# Patient Record
Sex: FEMALE | Race: OTHER | HISPANIC OR LATINO
[De-identification: names, ages, dates, MRNs, and addresses within clinical notes are randomized per-mention and may not be internally consistent; named-entity substitution may affect disease eponyms.]

---

## 2020-02-04 ENCOUNTER — ASOB RESULT (OUTPATIENT)
Age: 40
End: 2020-02-04

## 2020-02-04 ENCOUNTER — APPOINTMENT (OUTPATIENT)
Dept: ANTEPARTUM | Facility: CLINIC | Age: 40
End: 2020-02-04
Payer: MEDICAID

## 2020-02-04 ENCOUNTER — TRANSCRIPTION ENCOUNTER (OUTPATIENT)
Age: 40
End: 2020-02-04

## 2020-02-04 ENCOUNTER — EMERGENCY (EMERGENCY)
Facility: HOSPITAL | Age: 40
LOS: 1 days | Discharge: HOME | End: 2020-02-04
Attending: EMERGENCY MEDICINE
Payer: MEDICAID

## 2020-02-04 VITALS
RESPIRATION RATE: 20 BRPM | TEMPERATURE: 98 F | HEIGHT: 66 IN | DIASTOLIC BLOOD PRESSURE: 100 MMHG | HEART RATE: 100 BPM | OXYGEN SATURATION: 99 % | WEIGHT: 177.03 LBS | SYSTOLIC BLOOD PRESSURE: 168 MMHG

## 2020-02-04 LAB
ALBUMIN SERPL ELPH-MCNC: 4.3 G/DL — SIGNIFICANT CHANGE UP (ref 3.3–5.2)
ALP SERPL-CCNC: 60 U/L — SIGNIFICANT CHANGE UP (ref 40–120)
ALT FLD-CCNC: 33 U/L — HIGH
ANION GAP SERPL CALC-SCNC: 11 MMOL/L — SIGNIFICANT CHANGE UP (ref 5–17)
APPEARANCE UR: CLEAR — SIGNIFICANT CHANGE UP
APTT BLD: 30.9 SEC — SIGNIFICANT CHANGE UP (ref 27.5–36.3)
AST SERPL-CCNC: 25 U/L — SIGNIFICANT CHANGE UP
BASOPHILS # BLD AUTO: 0.04 K/UL — SIGNIFICANT CHANGE UP (ref 0–0.2)
BASOPHILS NFR BLD AUTO: 0.5 % — SIGNIFICANT CHANGE UP (ref 0–2)
BILIRUB SERPL-MCNC: <0.2 MG/DL — LOW (ref 0.4–2)
BILIRUB UR-MCNC: NEGATIVE — SIGNIFICANT CHANGE UP
BLD GP AB SCN SERPL QL: SIGNIFICANT CHANGE UP
BUN SERPL-MCNC: 10 MG/DL — SIGNIFICANT CHANGE UP (ref 8–20)
CALCIUM SERPL-MCNC: 8.9 MG/DL — SIGNIFICANT CHANGE UP (ref 8.6–10.2)
CHLORIDE SERPL-SCNC: 104 MMOL/L — SIGNIFICANT CHANGE UP (ref 98–107)
CO2 SERPL-SCNC: 23 MMOL/L — SIGNIFICANT CHANGE UP (ref 22–29)
COLOR SPEC: YELLOW — SIGNIFICANT CHANGE UP
CREAT SERPL-MCNC: 0.34 MG/DL — LOW (ref 0.5–1.3)
DIFF PNL FLD: NEGATIVE — SIGNIFICANT CHANGE UP
EOSINOPHIL # BLD AUTO: 0.1 K/UL — SIGNIFICANT CHANGE UP (ref 0–0.5)
EOSINOPHIL NFR BLD AUTO: 1.3 % — SIGNIFICANT CHANGE UP (ref 0–6)
GLUCOSE SERPL-MCNC: 100 MG/DL — HIGH (ref 70–99)
GLUCOSE UR QL: NEGATIVE MG/DL — SIGNIFICANT CHANGE UP
HCG SERPL-ACNC: 8742 MIU/ML — HIGH
HCT VFR BLD CALC: 37.8 % — SIGNIFICANT CHANGE UP (ref 34.5–45)
HGB BLD-MCNC: 12.5 G/DL — SIGNIFICANT CHANGE UP (ref 11.5–15.5)
IMM GRANULOCYTES NFR BLD AUTO: 0.3 % — SIGNIFICANT CHANGE UP (ref 0–1.5)
INR BLD: 1.06 RATIO — SIGNIFICANT CHANGE UP (ref 0.88–1.16)
KETONES UR-MCNC: NEGATIVE — SIGNIFICANT CHANGE UP
LEUKOCYTE ESTERASE UR-ACNC: NEGATIVE — SIGNIFICANT CHANGE UP
LIDOCAIN IGE QN: 17 U/L — LOW (ref 22–51)
LYMPHOCYTES # BLD AUTO: 2.7 K/UL — SIGNIFICANT CHANGE UP (ref 1–3.3)
LYMPHOCYTES # BLD AUTO: 35.8 % — SIGNIFICANT CHANGE UP (ref 13–44)
MCHC RBC-ENTMCNC: 28.8 PG — SIGNIFICANT CHANGE UP (ref 27–34)
MCHC RBC-ENTMCNC: 33.1 GM/DL — SIGNIFICANT CHANGE UP (ref 32–36)
MCV RBC AUTO: 87.1 FL — SIGNIFICANT CHANGE UP (ref 80–100)
MONOCYTES # BLD AUTO: 0.56 K/UL — SIGNIFICANT CHANGE UP (ref 0–0.9)
MONOCYTES NFR BLD AUTO: 7.4 % — SIGNIFICANT CHANGE UP (ref 2–14)
NEUTROPHILS # BLD AUTO: 4.13 K/UL — SIGNIFICANT CHANGE UP (ref 1.8–7.4)
NEUTROPHILS NFR BLD AUTO: 54.7 % — SIGNIFICANT CHANGE UP (ref 43–77)
NITRITE UR-MCNC: NEGATIVE — SIGNIFICANT CHANGE UP
PH UR: 6 — SIGNIFICANT CHANGE UP (ref 5–8)
PLATELET # BLD AUTO: 388 K/UL — SIGNIFICANT CHANGE UP (ref 150–400)
POTASSIUM SERPL-MCNC: 3.8 MMOL/L — SIGNIFICANT CHANGE UP (ref 3.5–5.3)
POTASSIUM SERPL-SCNC: 3.8 MMOL/L — SIGNIFICANT CHANGE UP (ref 3.5–5.3)
PROT SERPL-MCNC: 7.3 G/DL — SIGNIFICANT CHANGE UP (ref 6.6–8.7)
PROT UR-MCNC: NEGATIVE MG/DL — SIGNIFICANT CHANGE UP
PROTHROM AB SERPL-ACNC: 12.2 SEC — SIGNIFICANT CHANGE UP (ref 10–12.9)
RBC # BLD: 4.34 M/UL — SIGNIFICANT CHANGE UP (ref 3.8–5.2)
RBC # FLD: 12.5 % — SIGNIFICANT CHANGE UP (ref 10.3–14.5)
SODIUM SERPL-SCNC: 138 MMOL/L — SIGNIFICANT CHANGE UP (ref 135–145)
SP GR SPEC: 1.02 — SIGNIFICANT CHANGE UP (ref 1.01–1.02)
UROBILINOGEN FLD QL: NEGATIVE MG/DL — SIGNIFICANT CHANGE UP
WBC # BLD: 7.55 K/UL — SIGNIFICANT CHANGE UP (ref 3.8–10.5)
WBC # FLD AUTO: 7.55 K/UL — SIGNIFICANT CHANGE UP (ref 3.8–10.5)

## 2020-02-04 PROCEDURE — 80053 COMPREHEN METABOLIC PANEL: CPT

## 2020-02-04 PROCEDURE — 85027 COMPLETE CBC AUTOMATED: CPT

## 2020-02-04 PROCEDURE — 86900 BLOOD TYPING SEROLOGIC ABO: CPT

## 2020-02-04 PROCEDURE — 85610 PROTHROMBIN TIME: CPT

## 2020-02-04 PROCEDURE — 81003 URINALYSIS AUTO W/O SCOPE: CPT

## 2020-02-04 PROCEDURE — 86901 BLOOD TYPING SEROLOGIC RH(D): CPT

## 2020-02-04 PROCEDURE — 83690 ASSAY OF LIPASE: CPT

## 2020-02-04 PROCEDURE — 99283 EMERGENCY DEPT VISIT LOW MDM: CPT

## 2020-02-04 PROCEDURE — 99284 EMERGENCY DEPT VISIT MOD MDM: CPT

## 2020-02-04 PROCEDURE — 86850 RBC ANTIBODY SCREEN: CPT

## 2020-02-04 PROCEDURE — 76817 TRANSVAGINAL US OBSTETRIC: CPT

## 2020-02-04 PROCEDURE — 85730 THROMBOPLASTIN TIME PARTIAL: CPT

## 2020-02-04 PROCEDURE — 36415 COLL VENOUS BLD VENIPUNCTURE: CPT

## 2020-02-04 PROCEDURE — 84702 CHORIONIC GONADOTROPIN TEST: CPT

## 2020-02-04 RX ORDER — MISOPROSTOL 200 UG/1
8 TABLET ORAL
Qty: 32 | Refills: 0
Start: 2020-02-04 | End: 2020-02-04

## 2020-02-04 RX ORDER — IBUPROFEN 200 MG
1 TABLET ORAL
Qty: 30 | Refills: 0
Start: 2020-02-04

## 2020-02-04 NOTE — ED STATDOCS - PROGRESS NOTE DETAILS
SHEMAR Remy: Patient evaluated by intake physician. HPI/ROS/PE as noted above. Will follow up plan per intake physician and continue to assess patient. SHEMAR Remy: Pt evaluated by GYN. Cytotec to be sent to pharmacy. Follow up within the week.

## 2020-02-04 NOTE — ED STATDOCS - NSFOLLOWUPINSTRUCTIONS_ED_ALL_ED_FT
- Prescription sent to pharmacy.  - Please call tomorrow to schedule follow up appointment with OB within 1 week.  - Please seek immediate medical attention for any new/worsening, signs/symptoms, or concerns.

## 2020-02-04 NOTE — ED STATDOCS - PATIENT PORTAL LINK FT
You can access the FollowMyHealth Patient Portal offered by [facility] by registering at the following website: [email or web address]. By joining [employer]’s FollowMyHealth portal, you will also be able to view your health information using other applications (apps) compatible with our system. You can access the FollowMyHealth Patient Portal offered by [facility] by registering at the following website: [email or web address]. By joining [employer]’s FollowMyHealth portal, you will also be able to view your health information using other applications (apps) compatible with our system.

## 2020-02-04 NOTE — CONSULT NOTE ADULT - ASSESSMENT
YOLY DRAPER is a 40yo  @ 9wks4d by LMP (19) who presents to the ED from [facility] office for management of missed . On presentation to the ED, patient was hemodynamically stable, VSS. Sonogram from [facility] office revealed, CRL 15.3cm correlating to 8wks GA consistent with single IUP with no fetal cardiac activity.     Patient seen and examined at bedside with Dr. Ku. Given b-hCG and ultrasound findings, likely missed . Patient counseled in regards to medical vs. surgical vs. expectant management. Patient elects for medical management at this time. Risks/benefits of medical management with cytotec (including, but not limited to cramping, vaginal bleeding, need for repeat therapy), discussed with patient. Patient to be discharged home with cytotec 800mcg (with repeat 800mcg after 4 hours if no vaginal bleeding). Patient advised to follow up with [facility] clinic for repeat b-hCG and TVUS.    Plan d/w Dr. Ku

## 2020-02-04 NOTE — ED STATDOCS - ATTENDING CONTRIBUTION TO CARE
I, Rachel Garcia, performed the initial face to face bedside interview with this patient regarding history of present illness, review of symptoms and relevant past medical, social and family history.  I completed an independent physical examination.  I was the initial provider who evaluated this patient. I have signed out the follow up of any pending tests (i.e. labs, radiological studies) to the ACP.  I have communicated the patient’s plan of care and disposition with the ACP.  The history, relevant review of systems, past medical and surgical history, medical decision making, and physical examination was documented by the scribe in my presence and I attest to the accuracy of the documentation.

## 2020-02-04 NOTE — CONSULT NOTE ADULT - SUBJECTIVE AND OBJECTIVE BOX
YOLY DRAPER is a 40yo  @ 9wks4d by LMP (19) who presents to the ED from [facility] office for management of missed .    Patient states that she was seen today at the [facility] office for routine OB sonogram, at which time she was told that there was no fetal heart rate appreciated on ultrasound. She was told that she likely had a miscarriage and was told to come to the ED for further evaluation. Currently, she denies any vaginal bleeding, or discharge. Denies any abdominal trauma to the abdomen as well. Otherwise, uncomplicated prenatal course thus far.    OB HISTORY:    2003  @ 40wks - Male    2011  @ 40wks - Male       PAST GYN HISTORY:   - HX of abnormal pap smear  - Hx of ovarian cysts    PAST MEDICAL & SURGICAL HISTORY:  Denies      Allergies    No Known Allergies    Intolerances        MEDICATIONS  (STANDING):    MEDICATIONS  (PRN):      FAMILY HISTORY:      Social Hx: denies tobacco use, drug use. Social drinker.     ROS:  CONSTITUTIONAL: No fever, weight loss, or fatigue  RESPIRATORY: No shortness of breath  CARDIOVASCULAR: No chest pain, palpitations, dizziness, or leg swelling  GASTROINTESTINAL: No abdominal pain, nausea, vomiting, diarrhea or constipation.   GENITOURINARY: No dysuria or incontinence   ENDOCRINE: No heat or cold intolerance; No hair loss  PSYCHIATRIC: No depression, anxiety  HEME/LYMPH: No easy bruising, or bleeding gums    PHYSICAL EXAM-  T(C): 36.7 (20 @ 17:17), Max: 36.7 (20 @ 17:17)  HR: 100 (20 @ 17:17) (100 - 100)  BP: 168/100 (20 @ 17:17) (168/100 - 168/100)  RR: 20 (20 @ 17:17) (20 - 20)  SpO2: 99% (20 @ 17:17) (99% - 99%)    CONSTITUTIONAL: well developed no apparent distress, alert, oriented x 3.  ABDOMEN: soft, non-tender, +BS, no guarding/rebound/rigidity  PELVIC: Denies                                                       12.5   7.55  )-----------( 388      ( 2020 20:13 )             37.8         138  |  104  |  10.0  ----------------------------<  100<H>  3.8   |  23.0  |  0.34<L>    Ca    8.9      2020 20:13    TPro  7.3  /  Alb  4.3  /  TBili  <0.2<L>  /  DBili  x   /  AST  25  /  ALT  33<H>  /  AlkPhos  60  -    SERUM [facility]    8742.0   @ 20:13

## 2020-02-04 NOTE — CONSULT NOTE ADULT - ATTENDING COMMENTS
with missed ab at 8 weeks, no bleeding, options discussed with patient, she opted for medical mgmt  -cytotec 800mcg sent to pharmacy along with ibuprofen  -[facility]  -strong precautions provided

## 2020-02-04 NOTE — ED STATDOCS - OBJECTIVE STATEMENT
40 y/o F pt currently 9 weeks pregnant presents c/o ABD pain and chills. Had o/p US at [facility] today, which showed no fetal heart beat. Was sent to ED for further eval. Denies fever, CP, SOB, vaginal bleeding or dysuria. Tolerating PO, but has had decreased PO intake over the past few days. No further complaints at this time.

## 2020-02-04 NOTE — ED STATDOCS - CLINICAL SUMMARY MEDICAL DECISION MAKING FREE TEXT BOX
Patient presents s/p o/p US at [facility] showed fetal demise. Will consult OB, check labs and reeval.

## 2020-02-19 ENCOUNTER — ASOB RESULT (OUTPATIENT)
Age: 40
End: 2020-02-19

## 2020-02-19 ENCOUNTER — APPOINTMENT (OUTPATIENT)
Dept: ANTEPARTUM | Facility: CLINIC | Age: 40
End: 2020-02-19
Payer: MEDICAID

## 2020-02-19 PROCEDURE — 76817 TRANSVAGINAL US OBSTETRIC: CPT

## 2020-02-24 ENCOUNTER — RESULT REVIEW (OUTPATIENT)
Age: 40
End: 2020-02-24

## 2020-02-24 ENCOUNTER — INPATIENT (INPATIENT)
Facility: HOSPITAL | Age: 40
LOS: 0 days | Discharge: HOME | DRG: 770 | End: 2020-02-25
Attending: OBSTETRICS & GYNECOLOGY | Admitting: OBSTETRICS & GYNECOLOGY
Payer: MEDICAID

## 2020-02-24 ENCOUNTER — TRANSCRIPTION ENCOUNTER (OUTPATIENT)
Age: 40
End: 2020-02-24

## 2020-02-24 VITALS
TEMPERATURE: 98 F | WEIGHT: 179.9 LBS | DIASTOLIC BLOOD PRESSURE: 88 MMHG | HEART RATE: 76 BPM | SYSTOLIC BLOOD PRESSURE: 146 MMHG | HEIGHT: 64 IN | RESPIRATION RATE: 20 BRPM | OXYGEN SATURATION: 99 %

## 2020-02-24 DIAGNOSIS — O02.1 MISSED ABORTION: ICD-10-CM

## 2020-02-24 LAB
ANION GAP SERPL CALC-SCNC: 12 MMOL/L — SIGNIFICANT CHANGE UP (ref 5–17)
APPEARANCE UR: CLEAR — SIGNIFICANT CHANGE UP
APTT BLD: 31.1 SEC — SIGNIFICANT CHANGE UP (ref 27.5–36.3)
BASOPHILS # BLD AUTO: 0.04 K/UL — SIGNIFICANT CHANGE UP (ref 0–0.2)
BASOPHILS NFR BLD AUTO: 0.6 % — SIGNIFICANT CHANGE UP (ref 0–2)
BILIRUB UR-MCNC: NEGATIVE — SIGNIFICANT CHANGE UP
BLD GP AB SCN SERPL QL: SIGNIFICANT CHANGE UP
BUN SERPL-MCNC: 9 MG/DL — SIGNIFICANT CHANGE UP (ref 8–20)
CALCIUM SERPL-MCNC: 9.2 MG/DL — SIGNIFICANT CHANGE UP (ref 8.6–10.2)
CHLORIDE SERPL-SCNC: 103 MMOL/L — SIGNIFICANT CHANGE UP (ref 98–107)
CO2 SERPL-SCNC: 26 MMOL/L — SIGNIFICANT CHANGE UP (ref 22–29)
COLOR SPEC: YELLOW — SIGNIFICANT CHANGE UP
CREAT SERPL-MCNC: 0.5 MG/DL — SIGNIFICANT CHANGE UP (ref 0.5–1.3)
DIFF PNL FLD: NEGATIVE — SIGNIFICANT CHANGE UP
EOSINOPHIL # BLD AUTO: 0.12 K/UL — SIGNIFICANT CHANGE UP (ref 0–0.5)
EOSINOPHIL NFR BLD AUTO: 1.8 % — SIGNIFICANT CHANGE UP (ref 0–6)
GLUCOSE SERPL-MCNC: 124 MG/DL — HIGH (ref 70–99)
GLUCOSE UR QL: NEGATIVE MG/DL — SIGNIFICANT CHANGE UP
HCT VFR BLD CALC: 40.3 % — SIGNIFICANT CHANGE UP (ref 34.5–45)
HGB BLD-MCNC: 13 G/DL — SIGNIFICANT CHANGE UP (ref 11.5–15.5)
IMM GRANULOCYTES NFR BLD AUTO: 0.1 % — SIGNIFICANT CHANGE UP (ref 0–1.5)
INR BLD: 1.09 RATIO — SIGNIFICANT CHANGE UP (ref 0.88–1.16)
KETONES UR-MCNC: NEGATIVE — SIGNIFICANT CHANGE UP
LEUKOCYTE ESTERASE UR-ACNC: NEGATIVE — SIGNIFICANT CHANGE UP
LYMPHOCYTES # BLD AUTO: 3.24 K/UL — SIGNIFICANT CHANGE UP (ref 1–3.3)
LYMPHOCYTES # BLD AUTO: 48.3 % — HIGH (ref 13–44)
MCHC RBC-ENTMCNC: 28.3 PG — SIGNIFICANT CHANGE UP (ref 27–34)
MCHC RBC-ENTMCNC: 32.3 GM/DL — SIGNIFICANT CHANGE UP (ref 32–36)
MCV RBC AUTO: 87.6 FL — SIGNIFICANT CHANGE UP (ref 80–100)
MONOCYTES # BLD AUTO: 0.54 K/UL — SIGNIFICANT CHANGE UP (ref 0–0.9)
MONOCYTES NFR BLD AUTO: 8 % — SIGNIFICANT CHANGE UP (ref 2–14)
NEUTROPHILS # BLD AUTO: 2.76 K/UL — SIGNIFICANT CHANGE UP (ref 1.8–7.4)
NEUTROPHILS NFR BLD AUTO: 41.2 % — LOW (ref 43–77)
NITRITE UR-MCNC: NEGATIVE — SIGNIFICANT CHANGE UP
PH UR: 6.5 — SIGNIFICANT CHANGE UP (ref 5–8)
PLATELET # BLD AUTO: 412 K/UL — HIGH (ref 150–400)
POTASSIUM SERPL-MCNC: 4.2 MMOL/L — SIGNIFICANT CHANGE UP (ref 3.5–5.3)
POTASSIUM SERPL-SCNC: 4.2 MMOL/L — SIGNIFICANT CHANGE UP (ref 3.5–5.3)
PROT UR-MCNC: NEGATIVE MG/DL — SIGNIFICANT CHANGE UP
PROTHROM AB SERPL-ACNC: 12.3 SEC — SIGNIFICANT CHANGE UP (ref 10–12.9)
RBC # BLD: 4.6 M/UL — SIGNIFICANT CHANGE UP (ref 3.8–5.2)
RBC # FLD: 12.3 % — SIGNIFICANT CHANGE UP (ref 10.3–14.5)
SODIUM SERPL-SCNC: 141 MMOL/L — SIGNIFICANT CHANGE UP (ref 135–145)
SP GR SPEC: 1.01 — SIGNIFICANT CHANGE UP (ref 1.01–1.02)
UROBILINOGEN FLD QL: NEGATIVE MG/DL — SIGNIFICANT CHANGE UP
WBC # BLD: 6.71 K/UL — SIGNIFICANT CHANGE UP (ref 3.8–10.5)
WBC # FLD AUTO: 6.71 K/UL — SIGNIFICANT CHANGE UP (ref 3.8–10.5)

## 2020-02-24 PROCEDURE — 76817 TRANSVAGINAL US OBSTETRIC: CPT | Mod: 26

## 2020-02-24 PROCEDURE — 99283 EMERGENCY DEPT VISIT LOW MDM: CPT

## 2020-02-24 PROCEDURE — 88305 TISSUE EXAM BY PATHOLOGIST: CPT | Mod: 26

## 2020-02-24 PROCEDURE — 76815 OB US LIMITED FETUS(S): CPT | Mod: 26

## 2020-02-24 PROCEDURE — 76801 OB US < 14 WKS SINGLE FETUS: CPT | Mod: 26

## 2020-02-24 RX ORDER — SODIUM CHLORIDE 9 MG/ML
1000 INJECTION INTRAMUSCULAR; INTRAVENOUS; SUBCUTANEOUS
Refills: 0 | Status: DISCONTINUED | OUTPATIENT
Start: 2020-02-24 | End: 2020-02-25

## 2020-02-24 RX ADMIN — SODIUM CHLORIDE 100 MILLILITER(S): 9 INJECTION INTRAMUSCULAR; INTRAVENOUS; SUBCUTANEOUS at 20:54

## 2020-02-24 NOTE — ED ADULT TRIAGE NOTE - CHIEF COMPLAINT QUOTE
here 2 weeks ago, had OB Mis. Taking medications. Had sonogram 2/19. was told to come in for retaining products. no bleeding. Some abd pain

## 2020-02-24 NOTE — CONSULT NOTE ADULT - ATTENDING COMMENTS
desires surgical management for failed medical management of embryonic demise  understood risks, benefits, alternatives

## 2020-02-24 NOTE — ED STATDOCS - OBJECTIVE STATEMENT
had missed AB at 8 weeks, opted for medical management; had repeat US on , was told had retained product of conception presents to ED. Patient has very minimal episodic lower ABD pain. Denies fever, chiils, N/V/D. Patient ate rice, yucca and water 1 hour PTA. No further complaints at this time.  had missed AB at 8 weeks, opted for medical management; had repeat US on , was told had retained product of conception presents to ED D&C. Patient has very minimal episodic lower ABD pain. Denies fever, chills, vaginalbleeding N/V/D. Patient ate rice, yucca and water 1 hour PTA. No further complaints at this time.

## 2020-02-24 NOTE — ED ADULT NURSE NOTE - NSIMPLEMENTINTERV_GEN_ALL_ED
Implemented All Universal Safety Interventions:  [place] to call system. Call bell, personal items and telephone within reach. Instruct patient to call for assistance. Room bathroom lighting operational. Non-slip footwear when patient is off stretcher. Physically safe environment: no spills, clutter or unnecessary equipment. Stretcher in lowest position, wheels locked, appropriate side rails in place.

## 2020-02-24 NOTE — H&P ADULT - ASSESSMENT
YOLY DRAPER is a 38yo  @12w3d  by LMP (19) who presents to the ED from [facility] clinic for continued management following failed medical management of missed .    Patient hemodynamically stable at this time. Sono report confirmed retained products of conception. Will proceed with dilation and curettage. Patient counseled in regards to risks/benefits of surgical management.     Plan d/w Dr. Walls

## 2020-02-24 NOTE — ED STATDOCS - CLINICAL SUMMARY MEDICAL DECISION MAKING FREE TEXT BOX
Patient presents for D&C, will check preop labs and OB will f/u. Patient w hx missed ab and failed medical therapy presents for D&C, will check preop labs and OB will f/u.

## 2020-02-24 NOTE — ED ADULT TRIAGE NOTE - ARRIVAL FROM
Home I personally performed the service described in the documentation recorded by the scribe in my presence, and it accurately and completely records my words and actions.

## 2020-02-24 NOTE — H&P ADULT - HISTORY OF PRESENT ILLNESS
YOLY DRAPER is a 38yo  @12w3d  by LMP (19) who presents to the ED from [facility] clinic for continued management following failed medical management of missed     Patient was seen in the ED on 2020 after formal sonographic diagnosis of missed . At the time, patient was counseled in regards to expectant vs. medical vs. surgical management of missed ab. At the time, patient elected to undergo medical management with cytotec and was sent home with cytotec 800 mcg and NSAIDs and told to follow up with [facility] for repeat sonogram. Patient states that she took the cytotec on 2020 at which time she subsequently experienced intense abdominal cramping and vaginal bleeding. She states for the next 8 days afterwards, she continued to experience intermittent abdominal cramping pain and spotting vaginal bleeding.    Patient states that she had a repeat sonograph performed on 2020 at which time she was told that she still had retained products of conception and would possible require surgical management.     She denies any fevers, chills, CP, SOB, purulent vaginal discharge, diarrhea, or GI pain. Otherwise, no additional complaints.       OB HISTORY:    2003  @ 40wks - Male    2011  @ 40wks - Male       PAST GYN HISTORY:   - HX of abnormal pap smear  - Hx of ovarian cysts    PAST MEDICAL & SURGICAL HISTORY:  Denies      Allergies    No Known Allergies    Intolerances        MEDICATIONS  (STANDING):  sodium chloride 0.9%. 1000 milliLiter(s) (100 mL/Hr) IV Continuous <Continuous>    MEDICATIONS  (PRN):      FAMILY HISTORY:      Social Hx: denies tobacco use, drug use. Social drinker.     ROS:  As per HPI    PHYSICAL EXAM-  T(C): 36.6 (20 @ 17:12), Max: 36.6 (20 @ 17:12)  HR: 76 (20 @ 17:12) (76 - 76)  BP: 146/88 (20 @ 17:12) (146/88 - 146/88)  RR: 20 (20 @ 17:12) (20 - 20)  SpO2: 99% (20 @ 17:12) (99% - 99%)    CONSTITUTIONAL: well developed no apparent distress, alert, oriented x 3.  PULMONARY: Lungs clear to auscultation   CARDIOVASCULAR: RRR   ABDOMEN: soft, Minimal TTP along RLQ/LLQ, +BS, no guarding/rebound/rigidity    PELVIC: Deferred                                                      13.0   6.71  )-----------( 412      ( 2020 18:52 )             40.3     02-    141  |  103  |  9.0  ----------------------------<  124<H>  4.2   |  26.0  |  0.50    Ca    9.2      2020 18:52

## 2020-02-24 NOTE — ED STATDOCS - PROGRESS NOTE DETAILS
39 YEAR old female  presents to the ED for RPOC on US. Pt had a fetal demise at 8 weeks and was given cytotec. US on  showed retained products. Denies abd pain, vaginal bleeding. HPI/ ROS/ PEx as stated above. Pre-OP labs ordered.  GYN: [facility] consulted. US shows retained products, GYN will take pt to OR for D&C.

## 2020-02-24 NOTE — CONSULT NOTE ADULT - SUBJECTIVE AND OBJECTIVE BOX
YOLY DRAPER is a 40yo  @12w3d  by LMP (19) who presents to the ED from [facility] clinic for continued management following failed medical management of missed     Patient was seen in the ED on 2020 after formal sonographic diagnosis of missed . At the time, patient was counseled in regards to expectant vs. medical vs. surgical management of missed ab. At the time, patient elected to undergo medical management with cytotec and was sent home with cytotec 800 mcg and NSAIDs and told to follow up with [facility] for repeat sonogram. Patient states that she took the cytotec on 2020 at which time she subsequently experienced intense abdominal cramping and vaginal bleeding. She states for the next 8 days afterwards, she continued to experience intermittent abdominal cramping pain and spotting vaginal bleeding.    Patient states that she had a repeat sonograph performed on 2020 at which time she was told that she still had retained products of conception and would possible require surgical management.     She denies any fevers, chills, CP, SOB, purulent vaginal discharge, diarrhea, or GI pain. Otherwise, no additional complaints.       OB HISTORY:    2003  @ 40wks - Male    2011  @ 40wks - Male       PAST GYN HISTORY:   - HX of abnormal pap smear  - Hx of ovarian cysts    PAST MEDICAL & SURGICAL HISTORY:  Denies      Allergies    No Known Allergies    Intolerances        MEDICATIONS  (STANDING):  sodium chloride 0.9%. 1000 milliLiter(s) (100 mL/Hr) IV Continuous <Continuous>    MEDICATIONS  (PRN):      FAMILY HISTORY:      Social Hx: denies tobacco use, drug use. Social drinker.     ROS:  As per HPI    PHYSICAL EXAM-  T(C): 36.6 (20 @ 17:12), Max: 36.6 (20 @ 17:12)  HR: 76 (20 @ 17:12) (76 - 76)  BP: 146/88 (20 @ 17:12) (146/88 - 146/88)  RR: 20 (20 @ 17:12) (20 - 20)  SpO2: 99% (20 @ 17:12) (99% - 99%)    CONSTITUTIONAL: well developed no apparent distress, alert, oriented x 3.  PULMONARY: Lungs clear to auscultation   CARDIOVASCULAR: RRR   ABDOMEN: soft, Minimal TTP along RLQ/LLQ, +BS, no guarding/rebound/rigidity    PELVIC: Deferred                                                      13.0   6.71  )-----------( 412      ( 2020 18:52 )             40.3     02-    141  |  103  |  9.0  ----------------------------<  124<H>  4.2   |  26.0  |  0.50    Ca    9.2      2020 18:52          Radiology:

## 2020-02-24 NOTE — H&P ADULT - NSHPLABSRESULTS_GEN_ALL_CORE
< from: US Transvaginal, OB (20 @ 22:22) >       EXAM:  US OB LES THAN 14 WKS 1ST GEST                         EXAM:  US OB TRANSVAGINAL                          PROCEDURE DATE:  2020          INTERPRETATION:  CLINICAL INFORMATION: Spontaneous  15 days ago. Evaluate for retained products of conception.    COMPARISON: None none during this gestation.  Beta-HC    Endovaginal and transabdominal pelvic sonogram. Color and Spectral Doppler was performed.    FINDINGS:    Uterus: Retroverted uterus measures 8.7 cm. A 0.7 cm cyst is noted in the myometrium.     Endometrium: Heterogeneous endometrium measures 9 mm. Tiny endometrial cyst noted at the fundal aspect. High velocity, low-resistance trophoblastic type flow with velocities up to 152 cm/s, noted at the fundal aspect of the endometrium.     Right ovary: 3.1 x 2.2 x 1.7 cm. A 1.6 cm corpus luteum.. Normal arterial and venous waveforms.    Left ovary: 3.9 x 0.9 x 1.6 cm. Within normal limits. Normal arterial and venous waveforms.    Fluid: None.    IMPRESSION:    Trophoblastic type flow in the endometrium compatible with retained products of conception.                PETER LU M.D., ATTENDING RADIOLOGIST  This document has been electronically signed. 2020 10:30PM                  < end of copied text >

## 2020-02-24 NOTE — CONSULT NOTE ADULT - ASSESSMENT
YOLY DRAPER is a 38yo  @12w3d  by [facility] (19) who presents to the ED from [facility] clinic for continued management following failed medical management of missed

## 2020-02-25 VITALS
TEMPERATURE: 98 F | DIASTOLIC BLOOD PRESSURE: 88 MMHG | OXYGEN SATURATION: 99 % | RESPIRATION RATE: 18 BRPM | HEART RATE: 70 BPM | SYSTOLIC BLOOD PRESSURE: 132 MMHG

## 2020-02-25 PROCEDURE — 86850 RBC ANTIBODY SCREEN: CPT

## 2020-02-25 PROCEDURE — 80048 BASIC METABOLIC PNL TOTAL CA: CPT

## 2020-02-25 PROCEDURE — 76817 TRANSVAGINAL US OBSTETRIC: CPT

## 2020-02-25 PROCEDURE — 85027 COMPLETE CBC AUTOMATED: CPT

## 2020-02-25 PROCEDURE — 36415 COLL VENOUS BLD VENIPUNCTURE: CPT

## 2020-02-25 PROCEDURE — 85730 THROMBOPLASTIN TIME PARTIAL: CPT

## 2020-02-25 PROCEDURE — 86901 BLOOD TYPING SEROLOGIC RH(D): CPT

## 2020-02-25 PROCEDURE — 81003 URINALYSIS AUTO W/O SCOPE: CPT

## 2020-02-25 PROCEDURE — 99285 EMERGENCY DEPT VISIT HI MDM: CPT

## 2020-02-25 PROCEDURE — 76801 OB US < 14 WKS SINGLE FETUS: CPT

## 2020-02-25 PROCEDURE — 88305 TISSUE EXAM BY PATHOLOGIST: CPT

## 2020-02-25 PROCEDURE — 86900 BLOOD TYPING SEROLOGIC ABO: CPT

## 2020-02-25 PROCEDURE — 85610 PROTHROMBIN TIME: CPT

## 2020-02-25 RX ORDER — SODIUM CHLORIDE 9 MG/ML
1000 INJECTION, SOLUTION INTRAVENOUS
Refills: 0 | Status: DISCONTINUED | OUTPATIENT
Start: 2020-02-25 | End: 2020-02-25

## 2020-02-25 RX ORDER — HYDROMORPHONE HYDROCHLORIDE 2 MG/ML
0.5 INJECTION INTRAMUSCULAR; INTRAVENOUS; SUBCUTANEOUS
Refills: 0 | Status: DISCONTINUED | OUTPATIENT
Start: 2020-02-25 | End: 2020-02-25

## 2020-02-25 RX ORDER — IBUPROFEN 200 MG
1 TABLET ORAL
Qty: 30 | Refills: 0
Start: 2020-02-25

## 2020-02-25 RX ORDER — ONDANSETRON 8 MG/1
4 TABLET, FILM COATED ORAL ONCE
Refills: 0 | Status: DISCONTINUED | OUTPATIENT
Start: 2020-02-25 | End: 2020-02-25

## 2020-02-25 RX ADMIN — Medication 110 MILLIGRAM(S): at 00:25

## 2020-02-25 NOTE — BRIEF OPERATIVE NOTE - OPERATION/FINDINGS
8 WEEK SIZE RETROVERTED UTERUS. SMALL AMOUNT OF PRODUCTS CONCEPTION ASPIRATED USING SIZE 8 SUCTION CURETTE.

## 2020-02-25 NOTE — DISCHARGE NOTE PROVIDER - NSDCCPTREATMENT_GEN_ALL_CORE_FT
PRINCIPAL PROCEDURE  Procedure: Dilation and curettage, uterus, using suction, for incomplete   Findings and Treatment:

## 2020-02-25 NOTE — DISCHARGE NOTE PROVIDER - HOSPITAL COURSE
YOLY DRAPER is a 40yo  @12w3d  by LMP (19) who presents to the ED from [facility] clinic for continued management following failed medical management of missed . Patient post-operatively had an uncomplicated hospital course. Her pain was well controlled. She is tolerating a regular diet. She is ambulating independently. Labs and Vitals WNL upon discharge.

## 2020-02-25 NOTE — ASU DISCHARGE PLAN (ADULT/PEDIATRIC) - CARE PROVIDER_API CALL
Peyton Walls)  Obstetrics and Gynecology  [address] [place] [address]  Phone: [phone]  Fax: [phone]  Follow Up Time:

## 2020-02-25 NOTE — ASU DISCHARGE PLAN (ADULT/PEDIATRIC) - ACTIVITY LEVEL
Nothing per vagina/No douching/No tub baths/Weight bearing as tolerated/No tampons/No intercourse/Nothing per rectum/No heavy lifting

## 2020-02-25 NOTE — BRIEF OPERATIVE NOTE - NSICDXBRIEFPROCEDURE_GEN_ALL_CORE_FT
PROCEDURES:  Dilation and curettage, uterus, using suction, for incomplete  2020 00:59:05  Rimpel, Katherinne

## 2020-02-25 NOTE — DISCHARGE NOTE PROVIDER - NSDCFUADDINST_GEN_ALL_CORE_FT
May walk and climb stairs. No vigorous activity. Do not lift anything greater than 10lbs. Nothing per vagina x 6 weeks. Do not drive while on pain medication.

## 2020-02-25 NOTE — DISCHARGE NOTE PROVIDER - CARE PROVIDER_API CALL
Peyton Walls)  Obstetrics and Gynecology  [address] [place] [address]  Phone: [phone]  Fax: [phone]  Follow Up Time: 1 week

## 2020-02-25 NOTE — ASU DISCHARGE PLAN (ADULT/PEDIATRIC) - CALL YOUR DOCTOR IF YOU HAVE ANY OF THE FOLLOWING:
Fever greater than (need to indicate Fahrenheit or Celsius)/Numbness, tingling, color or temperature change to extremity/Unable to urinate/Pain not relieved by Medications

## 2021-12-05 ENCOUNTER — EMERGENCY (EMERGENCY)
Facility: HOSPITAL | Age: 41
LOS: 1 days | End: 2021-12-05
Admitting: EMERGENCY MEDICINE
Payer: MEDICAID

## 2021-12-05 PROCEDURE — 99285 EMERGENCY DEPT VISIT HI MDM: CPT

## 2021-12-05 PROCEDURE — 76801 OB US < 14 WKS SINGLE FETUS: CPT | Mod: 26

## 2021-12-05 PROCEDURE — 76817 TRANSVAGINAL US OBSTETRIC: CPT | Mod: 26

## 2022-07-07 NOTE — ED STATDOCS - ATTESTATION, MLM
I have reviewed and confirmed nurses' notes for patient's medications, allergies, medical history, and surgical history. No indicators present

## 2023-10-17 ENCOUNTER — ASOB RESULT (OUTPATIENT)
Age: 43
End: 2023-10-17

## 2023-10-17 ENCOUNTER — APPOINTMENT (OUTPATIENT)
Dept: ANTEPARTUM | Facility: CLINIC | Age: 43
End: 2023-10-17
Payer: MEDICAID

## 2023-10-17 PROCEDURE — 36416 COLLJ CAPILLARY BLOOD SPEC: CPT

## 2023-10-17 PROCEDURE — 76813 OB US NUCHAL MEAS 1 GEST: CPT

## 2023-10-20 LAB
ADDITIONAL US: NORMAL
COMMENTS: AFP: NORMAL
CRL SCAN TWIN B: NORMAL
CRL SCAN: NORMAL
CROWN RUMP LENGTH TWIN B: NORMAL
CROWN RUMP LENGTH: 67.8 MM
DOWN SYNDROME AGE RISK: NORMAL
DOWN SYNDROME INTERPRETATION: NORMAL
DOWN SYNDROME SCREENING RISK: NORMAL
GEST. AGE ON COLLECTION DATE: 12.9 WEEKS
HCG MOM: 1.25
HCG VALUE: 98.6 IU/ML
MATERNAL AGE AT EDD: 41.4 YR
NOTE: AFP: NORMAL
NT MOM TWIN B: NORMAL
NT TWIN B: NORMAL
NUCHAL TRANSLUCENCY (NT): 2.3 MM
NUCHAL TRANSLUCENCY MOM: 1.31
NUMBER OF FETUSES: 1
PAPP-A MOM: 0.75
PAPP-A VALUE: 654.5 NG/ML
RACE: NORMAL
RESULTS AFP: NORMAL
SONOGRAPHER ID#: NORMAL
SUBMIT PART 2 SAMPLE USING: NORMAL
TEST RESULTS: AFP: NORMAL
TRISOMY 18 AGE RISK: NORMAL
TRISOMY 18 INTERPRETATION: NORMAL
TRISOMY 18 SCREENING RISK: NORMAL
WEIGHT AFP: 192 LBS

## 2023-11-29 ENCOUNTER — APPOINTMENT (OUTPATIENT)
Dept: ANTEPARTUM | Facility: CLINIC | Age: 43
End: 2023-11-29
Payer: MEDICAID

## 2023-11-29 ENCOUNTER — ASOB RESULT (OUTPATIENT)
Age: 43
End: 2023-11-29

## 2023-11-29 PROCEDURE — 76817 TRANSVAGINAL US OBSTETRIC: CPT

## 2023-11-29 PROCEDURE — 76811 OB US DETAILED SNGL FETUS: CPT

## 2024-01-24 ENCOUNTER — APPOINTMENT (OUTPATIENT)
Dept: ANTEPARTUM | Facility: CLINIC | Age: 44
End: 2024-01-24
Payer: MEDICAID

## 2024-01-24 ENCOUNTER — ASOB RESULT (OUTPATIENT)
Age: 44
End: 2024-01-24

## 2024-01-24 PROCEDURE — 76816 OB US FOLLOW-UP PER FETUS: CPT

## 2024-02-19 ENCOUNTER — INPATIENT (INPATIENT)
Facility: HOSPITAL | Age: 44
LOS: 18 days | Discharge: HOME | End: 2024-03-09
Attending: OBSTETRICS & GYNECOLOGY | Admitting: OBSTETRICS & GYNECOLOGY
Payer: COMMERCIAL

## 2024-02-19 VITALS
SYSTOLIC BLOOD PRESSURE: 167 MMHG | DIASTOLIC BLOOD PRESSURE: 84 MMHG | RESPIRATION RATE: 18 BRPM | TEMPERATURE: 97 F | HEART RATE: 86 BPM

## 2024-02-19 DIAGNOSIS — Z00.00 ENCOUNTER FOR GENERAL ADULT MEDICAL EXAMINATION WITHOUT ABNORMAL FINDINGS: ICD-10-CM

## 2024-02-19 DIAGNOSIS — Z3A.31 31 WEEKS GESTATION OF PREGNANCY: ICD-10-CM

## 2024-02-19 DIAGNOSIS — O26.899 OTHER SPECIFIED PREGNANCY RELATED CONDITIONS, UNSPECIFIED TRIMESTER: ICD-10-CM

## 2024-02-19 DIAGNOSIS — O14.13 SEVERE PRE-ECLAMPSIA, THIRD TRIMESTER: ICD-10-CM

## 2024-02-19 DIAGNOSIS — O26.893 OTHER SPECIFIED PREGNANCY RELATED CONDITIONS, THIRD TRIMESTER: ICD-10-CM

## 2024-02-19 DIAGNOSIS — O09.529 SUPERVISION OF ELDERLY MULTIGRAVIDA, UNSPECIFIED TRIMESTER: ICD-10-CM

## 2024-02-19 LAB
ALBUMIN SERPL ELPH-MCNC: 3.5 G/DL — SIGNIFICANT CHANGE UP (ref 3.3–5.2)
ALP SERPL-CCNC: 104 U/L — SIGNIFICANT CHANGE UP (ref 40–120)
ALT FLD-CCNC: 23 U/L — SIGNIFICANT CHANGE UP
ANION GAP SERPL CALC-SCNC: 16 MMOL/L — SIGNIFICANT CHANGE UP (ref 5–17)
APPEARANCE UR: CLEAR — SIGNIFICANT CHANGE UP
APTT BLD: 27.3 SEC — SIGNIFICANT CHANGE UP (ref 24.5–35.6)
AST SERPL-CCNC: 27 U/L — SIGNIFICANT CHANGE UP
BACTERIA # UR AUTO: ABNORMAL /HPF
BASOPHILS # BLD AUTO: 0.03 K/UL — SIGNIFICANT CHANGE UP (ref 0–0.2)
BASOPHILS NFR BLD AUTO: 0.4 % — SIGNIFICANT CHANGE UP (ref 0–2)
BILIRUB SERPL-MCNC: <0.2 MG/DL — LOW (ref 0.4–2)
BILIRUB UR-MCNC: NEGATIVE — SIGNIFICANT CHANGE UP
BLD GP AB SCN SERPL QL: SIGNIFICANT CHANGE UP
BUN SERPL-MCNC: 6.6 MG/DL — LOW (ref 8–20)
CALCIUM SERPL-MCNC: 9 MG/DL — SIGNIFICANT CHANGE UP (ref 8.4–10.5)
CAST: 1 /LPF — SIGNIFICANT CHANGE UP (ref 0–4)
CHLORIDE SERPL-SCNC: 103 MMOL/L — SIGNIFICANT CHANGE UP (ref 96–108)
CO2 SERPL-SCNC: 19 MMOL/L — LOW (ref 22–29)
COLOR SPEC: YELLOW — SIGNIFICANT CHANGE UP
CREAT ?TM UR-MCNC: 94 MG/DL — SIGNIFICANT CHANGE UP
CREAT SERPL-MCNC: 0.38 MG/DL — LOW (ref 0.5–1.3)
DIFF PNL FLD: NEGATIVE — SIGNIFICANT CHANGE UP
EGFR: 129 ML/MIN/1.73M2 — SIGNIFICANT CHANGE UP
EOSINOPHIL # BLD AUTO: 0.14 K/UL — SIGNIFICANT CHANGE UP (ref 0–0.5)
EOSINOPHIL NFR BLD AUTO: 1.7 % — SIGNIFICANT CHANGE UP (ref 0–6)
FIBRINOGEN PPP-MCNC: 569 MG/DL — HIGH (ref 200–450)
GLUCOSE SERPL-MCNC: 124 MG/DL — HIGH (ref 70–99)
GLUCOSE UR QL: NEGATIVE MG/DL — SIGNIFICANT CHANGE UP
HCT VFR BLD CALC: 36.5 % — SIGNIFICANT CHANGE UP (ref 34.5–45)
HGB BLD-MCNC: 12.6 G/DL — SIGNIFICANT CHANGE UP (ref 11.5–15.5)
HIV 1 & 2 AB SERPL IA.RAPID: SIGNIFICANT CHANGE UP
IMM GRANULOCYTES NFR BLD AUTO: 0.5 % — SIGNIFICANT CHANGE UP (ref 0–0.9)
INR BLD: 0.89 RATIO — SIGNIFICANT CHANGE UP (ref 0.85–1.18)
KETONES UR-MCNC: NEGATIVE MG/DL — SIGNIFICANT CHANGE UP
LDH SERPL L TO P-CCNC: 206 U/L — HIGH (ref 98–192)
LEUKOCYTE ESTERASE UR-ACNC: ABNORMAL
LYMPHOCYTES # BLD AUTO: 2.35 K/UL — SIGNIFICANT CHANGE UP (ref 1–3.3)
LYMPHOCYTES # BLD AUTO: 28.7 % — SIGNIFICANT CHANGE UP (ref 13–44)
MCHC RBC-ENTMCNC: 29.1 PG — SIGNIFICANT CHANGE UP (ref 27–34)
MCHC RBC-ENTMCNC: 34.5 GM/DL — SIGNIFICANT CHANGE UP (ref 32–36)
MCV RBC AUTO: 84.3 FL — SIGNIFICANT CHANGE UP (ref 80–100)
MONOCYTES # BLD AUTO: 0.56 K/UL — SIGNIFICANT CHANGE UP (ref 0–0.9)
MONOCYTES NFR BLD AUTO: 6.8 % — SIGNIFICANT CHANGE UP (ref 2–14)
NEUTROPHILS # BLD AUTO: 5.07 K/UL — SIGNIFICANT CHANGE UP (ref 1.8–7.4)
NEUTROPHILS NFR BLD AUTO: 61.9 % — SIGNIFICANT CHANGE UP (ref 43–77)
NITRITE UR-MCNC: NEGATIVE — SIGNIFICANT CHANGE UP
PH UR: 6 — SIGNIFICANT CHANGE UP (ref 5–8)
PLATELET # BLD AUTO: 380 K/UL — SIGNIFICANT CHANGE UP (ref 150–400)
POTASSIUM SERPL-MCNC: 3.7 MMOL/L — SIGNIFICANT CHANGE UP (ref 3.5–5.3)
POTASSIUM SERPL-SCNC: 3.7 MMOL/L — SIGNIFICANT CHANGE UP (ref 3.5–5.3)
PROT ?TM UR-MCNC: 18 MG/DL — HIGH (ref 0–12)
PROT SERPL-MCNC: 6.7 G/DL — SIGNIFICANT CHANGE UP (ref 6.6–8.7)
PROT UR-MCNC: SIGNIFICANT CHANGE UP MG/DL
PROT/CREAT UR-RTO: 0.2 RATIO — SIGNIFICANT CHANGE UP
PROTHROM AB SERPL-ACNC: 9.9 SEC — SIGNIFICANT CHANGE UP (ref 9.5–13)
RBC # BLD: 4.33 M/UL — SIGNIFICANT CHANGE UP (ref 3.8–5.2)
RBC # FLD: 13.8 % — SIGNIFICANT CHANGE UP (ref 10.3–14.5)
RBC CASTS # UR COMP ASSIST: 1 /HPF — SIGNIFICANT CHANGE UP (ref 0–4)
SODIUM SERPL-SCNC: 137 MMOL/L — SIGNIFICANT CHANGE UP (ref 135–145)
SP GR SPEC: 1.02 — SIGNIFICANT CHANGE UP (ref 1–1.03)
SQUAMOUS # UR AUTO: 13 /HPF — HIGH (ref 0–5)
URATE SERPL-MCNC: 2 MG/DL — LOW (ref 2.4–5.7)
UROBILINOGEN FLD QL: 1 MG/DL — SIGNIFICANT CHANGE UP (ref 0.2–1)
WBC # BLD: 8.19 K/UL — SIGNIFICANT CHANGE UP (ref 3.8–10.5)
WBC # FLD AUTO: 8.19 K/UL — SIGNIFICANT CHANGE UP (ref 3.8–10.5)
WBC UR QL: 6 /HPF — HIGH (ref 0–5)

## 2024-02-19 RX ORDER — NIFEDIPINE 30 MG
30 TABLET, EXTENDED RELEASE 24 HR ORAL DAILY
Refills: 0 | Status: DISCONTINUED | OUTPATIENT
Start: 2024-02-19 | End: 2024-02-21

## 2024-02-19 RX ORDER — CHLORHEXIDINE GLUCONATE 213 G/1000ML
1 SOLUTION TOPICAL DAILY
Refills: 0 | Status: DISCONTINUED | OUTPATIENT
Start: 2024-02-19 | End: 2024-02-20

## 2024-02-19 RX ORDER — CITRIC ACID/SODIUM CITRATE 300-500 MG
30 SOLUTION, ORAL ORAL ONCE
Refills: 0 | Status: DISCONTINUED | OUTPATIENT
Start: 2024-02-19 | End: 2024-02-20

## 2024-02-19 RX ORDER — MAGNESIUM SULFATE 500 MG/ML
4 VIAL (ML) INJECTION ONCE
Refills: 0 | Status: DISCONTINUED | OUTPATIENT
Start: 2024-02-19 | End: 2024-02-19

## 2024-02-19 RX ORDER — CALCIUM CARBONATE 500(1250)
1 TABLET ORAL EVERY 6 HOURS
Refills: 0 | Status: DISCONTINUED | OUTPATIENT
Start: 2024-02-19 | End: 2024-03-09

## 2024-02-19 RX ORDER — LABETALOL HCL 100 MG
40 TABLET ORAL ONCE
Refills: 0 | Status: COMPLETED | OUTPATIENT
Start: 2024-02-19 | End: 2024-02-19

## 2024-02-19 RX ORDER — SODIUM CHLORIDE 9 MG/ML
1000 INJECTION, SOLUTION INTRAVENOUS
Refills: 0 | Status: DISCONTINUED | OUTPATIENT
Start: 2024-02-19 | End: 2024-02-20

## 2024-02-19 RX ORDER — LABETALOL HCL 100 MG
20 TABLET ORAL ONCE
Refills: 0 | Status: COMPLETED | OUTPATIENT
Start: 2024-02-19 | End: 2024-02-19

## 2024-02-19 RX ORDER — INFLUENZA VIRUS VACCINE 15; 15; 15; 15 UG/.5ML; UG/.5ML; UG/.5ML; UG/.5ML
0.5 SUSPENSION INTRAMUSCULAR ONCE
Refills: 0 | Status: DISCONTINUED | OUTPATIENT
Start: 2024-02-19 | End: 2024-02-20

## 2024-02-19 RX ORDER — OXYTOCIN 10 UNIT/ML
333.33 VIAL (ML) INJECTION
Qty: 20 | Refills: 0 | Status: DISCONTINUED | OUTPATIENT
Start: 2024-02-19 | End: 2024-02-20

## 2024-02-19 RX ORDER — MAGNESIUM SULFATE 500 MG/ML
4 VIAL (ML) INJECTION ONCE
Refills: 0 | Status: COMPLETED | OUTPATIENT
Start: 2024-02-19 | End: 2024-02-19

## 2024-02-19 RX ORDER — MAGNESIUM SULFATE 500 MG/ML
2 VIAL (ML) INJECTION
Qty: 40 | Refills: 0 | Status: DISCONTINUED | OUTPATIENT
Start: 2024-02-19 | End: 2024-03-06

## 2024-02-19 RX ORDER — MAGNESIUM SULFATE 500 MG/ML
2 VIAL (ML) INJECTION
Qty: 40 | Refills: 0 | Status: DISCONTINUED | OUTPATIENT
Start: 2024-02-19 | End: 2024-02-19

## 2024-02-19 RX ADMIN — Medication 40 MILLIGRAM(S): at 19:24

## 2024-02-19 RX ADMIN — Medication 12 MILLIGRAM(S): at 20:30

## 2024-02-19 RX ADMIN — SODIUM CHLORIDE 125 MILLILITER(S): 9 INJECTION, SOLUTION INTRAVENOUS at 20:38

## 2024-02-19 RX ADMIN — Medication 30 MILLIGRAM(S): at 20:58

## 2024-02-19 RX ADMIN — Medication 300 GRAM(S): at 19:27

## 2024-02-19 RX ADMIN — Medication 50 GM/HR: at 19:51

## 2024-02-19 RX ADMIN — Medication 20 MILLIGRAM(S): at 19:03

## 2024-02-19 NOTE — OB PROVIDER H&P - ASSESSMENT
A/P: 43y  at 31w5d GA admitted to L&D  A/P: 43y  at 31w5d GA admitted to L&D   - Initial BPs upon presentation to triage 190/90, repeat BPs meeting criteria for severe preeclampsia, required IVP labetalol 20 > 40 with good response after IVP 40mg.   - Will continue to monitor BPs, no standing anti-hypertensives at this time   - Will admit to antepartum service for PECwSF  - PIH labs collected   - Start on magnesium sulfate for 24 hours for seizure prophylaxis   - Course of BMZ for fetal lung maturation  - Collect GBS swab   - Given BP appropriately responded to IVP labetalol, no indication for induction at this time, will continue to monitor   - MFM consulted     Discussed w/ Dr. You

## 2024-02-19 NOTE — CONSULT NOTE ADULT - PROBLEM SELECTOR RECOMMENDATION 4
- daily pnv  - keep type and screen active  - dvt ppx: encourage ambulation, SCDs when in bed, heparin subq to start on HD#3  - FHT reactive, continuous NST while on Mg then can possibly transition to NST BID, will evaluate at that time

## 2024-02-19 NOTE — OB RN PATIENT PROFILE - FUNCTIONAL SCREEN CURRENT LEVEL: SWALLOWING (IF SCORE 2 OR MORE FOR ANY ITEM, CONSULT REHAB SERVICES), MLM)
Goals      Understands red flags post discharge. 2/22/19-NN spoke to patient. She states she has had no more evidence of fever or symptoms which might indicate UTI. Her Urine culture of 2/15/19 did not show any evidence of infection. She is trying to drink more water as instructed. She will f/u with PCP at the end of March. NN provided contact information should she have questions/concerns. / vs    2/19/19-NN attempted to call patient to see how she is doing. No answer at her phone number. Will attempt to call patient in several days. / vs     2/5/19-NN spoke with patient re: recent hospital stay for UTI/sepsis. Reviewed red flags with patient to be aware of for return of infection, such as fever, pain or increased frequency of urination, confusion, fatigue. Patient voiced understanding and will report any such symptoms to MD over the next week should they occur. NN gave patient contact information to have on hand should she have any questions. JANAY visit will occur next week with PCP.  NN will f/u with patient after visit./ vs
0 = swallows foods/liquids without difficulty

## 2024-02-19 NOTE — CONSULT NOTE ADULT - PROBLEM SELECTOR RECOMMENDATION 3
AMA is a risk factor for adverse maternal, fetal, and  outcomes. With delivery at age 40 years or older, there are increased rates of chromosomal abnormalities and aneuploidy, congenital anomalies, LGA and SGA neonates,  morbidity and stillbirth, GDM, PEC, labor dystocia, and  delivery.   - her NIPS was LR  - she was prescribed ASA ~25w GA, but she has only been taking it 3x/wk  - will rpt growth sonogram tomorrow, most recent () was 12%ile

## 2024-02-19 NOTE — CONSULT NOTE ADULT - SUBJECTIVE AND OBJECTIVE BOX
YOLY JERONIMO  A 43year old  Last Menstrual Period 23   EDC 24 at 31.5w Gestational Age sent in from the office for elevated BPs to 140s/90s. In triage, pt found to have severe range BPs and met criteria for hypertensive emergency and received labetalol 20/40mg IVP with an improvement in her BPs. MFM consulted for management of  severe preeclampsia.    Pt denies any HA, CP, lightheadedness/dizziness, vision changes, RUQ pain. Endorses difficulty taking a deep breath for the past week along with LE swelling. Denies contractions, loss of fluid, vaginal bleeding. Reports normal fetal movement. States she was monitoring her BPs in one of her prior pregnancies but was never diagnosed with a hypertensive disorder and ended up delivering at 40 weeks. States she started taking asa around 25w GA, but she has only been taking it about 3x/wk. States this pregnancy has been uncomplicated until now.      Pregnancy course:  AMA      Past OB/GYN Hx: POB: : [birth year] FT  uncomplicated, Male, 6lbs  G2: 11 FT  uncomplicated, Male, 8lbs  SABx3 w/ D&C for first 2 SABs and medication on 3rd SAB (, , )  PGYN: -fibroids, + ovarian cysts, denies STD hx, + h/o ASCUS pap, s/p benign colposcopy     PAST MEDICAL & SURGICAL HISTORY:  No pertinent past medical history      D&Cx2      Allergies: possible allergy to cytotec, reports rash on one hand after taking buccal cytotec for SAB      Social History: Denies ETOH, smoking and drugs.     Meds: PNVs, ASA    REVIEW OF SYSTEMS:    CONSTITUTIONAL: No weakness, fevers or chills  EYES/ENT: No visual changes;  No vertigo or throat pain   NECK: No pain or stiffness  RESPIRATORY: No cough, wheezing, hemoptysis  CARDIOVASCULAR: No chest pain or palpitations  GASTROINTESTINAL: No nausea, vomiting  GENITOURINARY: No dysuria, frequency or hematuria  NEUROLOGICAL: No numbness or weakness  SKIN: No itching, burning, rashes, or lesions   All other review of systems is negative unless indicated above.    Vital Signs:  Vital Signs Last 24 Hrs  T(C): 36.3 (2024 19:27), Max: 36.3 (2024 18:31)  T(F): 97.3 (2024 19:27), Max: 97.3 (2024 18:31)  HR: 83 (2024 20:11) (78 - 96)  BP: 142/77 (2024 20:11) (114/65 - 190/94)  RR: 18 (:27) (18 - 18)    Parameters below as of 2024 19:27  Patient On (Oxygen Delivery Method): room air      Height (cm): 165.1 (24 @ 19:27)  Weight (kg): 99.8 (24 @ 19:27)  BMI (kg/m2): 36.6 (24 @ 19:27)  BSA (m2): 2.06 (24 @ 19:27)    Physical Exam:  General: Adult female in NAD  CVS: RRR, +S1/S2, no murmurs  Lungs: CTAB, no wheezing, rhonchi or rales  Abdomen: soft, non-tender, gravid uterus  Pelvic: Deferred  Ext: No cyanosis, edema or calf tenderness  Skin: No rashes or lesions on exposed skin  Neuro: Normal DTRs, grossly intact    Labs:                       12.6   8.19  )-----------( 380      ( 2024 19:00 )             36.5     -    137  |  103  |  6.6<L>  ----------------------------<  124<H>  3.7   |  19.0<L>  |  0.38<L>    Ca    9.0      2024 19:00    TPro  6.7  /  Alb  3.5  /  TBili  <0.2<L>  /  DBili  x   /  AST  27  /  ALT  23  /  AlkPhos  104  -    PT/INR - ( 2024 19:00 )   PT: 9.9 sec;   INR: 0.89 ratio         PTT - ( 2024 19:00 )  PTT:27.3 sec      MEDICATIONS  (STANDING):  betamethasone Injectable 12 milliGRAM(s) IntraMuscular every 24 hours  chlorhexidine 2% Cloths 1 Application(s) Topical daily  citric acid/sodium citrate Solution 30 milliLiter(s) Oral once  influenza   Vaccine 0.5 milliLiter(s) IntraMuscular once  lactated ringers. 1000 milliLiter(s) (125 mL/Hr) IV Continuous <Continuous>  magnesium sulfate Infusion 2 Gm/Hr (50 mL/Hr) IV Continuous <Continuous>  oxytocin Infusion 333.333 milliUNIT(s)/Min (1000 mL/Hr) IV Continuous <Continuous>    MEDICATIONS  (PRN):  calcium carbonate    500 mG (Tums) Chewable 1 Tablet(s) Chew every 6 hours PRN Heartburn

## 2024-02-19 NOTE — CONSULT NOTE ADULT - PROBLEM SELECTOR RECOMMENDATION 9
- dating by LMP  - last EFW 1/24 1026g, 12%ile, for rpt MFM growth sonogram tomorrow  - daily pnv  - do not anticipate delivery at this time, but if indicated for induction <32w, would continue Mg for neuroprotection  - currently GBS unknown, GBS ordered, if delivery indicated prior to results, would start amp for ppx - dating by LMP  - last EFW  1026g, 12%ile, for rpt MFM growth sonogram tomorrow  - daily pnv  - do not anticipate delivery at this time, but if indicated for induction <32w, would continue Mg for neuroprotection  - currently GBS unknown, GBS ordered, if delivery indicated prior to results, would start amp for ppx  - betamethasone ordered for fetal lung maturity in anticipation of  delivery - dating by LMP  - last EFW  1026g, 12%ile, for rpt MFM growth sonogram tomorrow  - daily pnv  - do not anticipate delivery at this time, but if indicated for delivery <32w, would continue Mg for neuroprotection  - currently GBS unknown, GBS ordered, if delivery indicated prior to results, would start amp for ppx  - betamethasone ordered for fetal lung maturity in anticipation of  delivery

## 2024-02-19 NOTE — OB PROVIDER H&P - NSHPPHYSICALEXAM_GEN_ALL_CORE
T(C): 36.3 (02-19-24 @ 18:31), Max: 36.3 (02-19-24 @ 18:31)  HR: 90 (02-19-24 @ 19:31) (85 - 96)  BP: 138/69 (02-19-24 @ 19:31) (138/69 - 190/94)  RR: 18 (02-19-24 @ 18:31) (18 - 18)  SpO2: --  Gen: NAD, well-appearing   Abd: Soft, gravid  Ext: non-tender, non-edematous  SVE:  deferred   Bedside sono:  FHT: baseline 135, moderate variability, + accels, - decels  [place]: acontractile T(C): 36.3 (02-19-24 @ 18:31), Max: 36.3 (02-19-24 @ 18:31)  HR: 90 (02-19-24 @ 19:31) (85 - 96)  BP: 138/69 (02-19-24 @ 19:31) (138/69 - 190/94)  RR: 18 (02-19-24 @ 18:31) (18 - 18)  SpO2: --  Gen: NAD, well-appearing   Abd: Soft, gravid  Ext: non-tender, non-edematous    FHT: baseline 135, moderate variability, + accels, - decels  [place]: acontractile

## 2024-02-19 NOTE — OB RN TRIAGE NOTE - FALL HARM RISK - UNIVERSAL INTERVENTIONS
Bed in lowest position, wheels locked, appropriate side rails in place/Call bell, personal items and telephone in reach/Instruct patient to call for assistance before getting out of bed or chair/Non-slip footwear when patient is out of bed/[place] to call system/Physically safe environment - no spills, clutter or unnecessary equipment/Purposeful Proactive Rounding/Room/bathroom lighting operational, light cord in reach

## 2024-02-19 NOTE — OB PROVIDER H&P - NSLOWPPHRISK_OBGYN_A_OB
No previous uterine incision/Bill Pregnancy/Less than or equal to 4 previous vaginal births/No known bleeding disorder/No history of postpartum hemorrhage/No other PPH risks indicated

## 2024-02-19 NOTE — CONSULT NOTE ADULT - ASSESSMENT
43year old  @31.5w GA admitted for management of  preeclampsia with severe features.    Plan discussed with KRANTHI Capps fellow

## 2024-02-19 NOTE — CONSULT NOTE ADULT - PROBLEM SELECTOR RECOMMENDATION 2
- multiple severe range BPs in triage, meeting criteria for hypertensive emergency  - BPs responded to IVP lab 20/40mg  - Mg started, continue for 24h for seizure ppx   - BPs currently moderately hypertensive, will start procardia 30mg qd and continue to monitor closely  - prior NSVDx2, plan for IOL at 34w 2/2 PECwSF unless indicated sooner due to labs or uncontrolled BPs - multiple severe range BPs in triage, meeting criteria for hypertensive emergency  - BPs responded to IVP lab 20/40mg  - Mg started, continue for 24h for seizure ppx   - BPs currently moderately hypertensive, will start procardia 30mg qd and continue to monitor closely  - prior NSVDx2, plan for IOL at 34w if cephalic presentation 2/2 PECwSF unless indicated sooner due to labs or uncontrolled BPs - multiple severe range BPs in triage, meeting criteria for hypertensive emergency  - BPs responded to IVP lab 20/40mg  - Mg started, continue for 24h for seizure ppx   - labs unremarkable, p/c 0.2  - BPs currently moderately hypertensive, will start procardia 30mg qd and continue to monitor closely  - prior NSVDx2, plan for IOL at 34w if cephalic presentation 2/2 PECwSF unless indicated sooner due to labs or uncontrolled BPs

## 2024-02-19 NOTE — OB RN PATIENT PROFILE - FUNCTIONAL ASSESSMENT - BASIC MOBILITY SCORE.
resolving  Presented with symptomatic acute blood loss anemia. Baseline Hb in 10/2018 ~8.   In ED with Hb 7.6 --> 6.6 in setting of BRBPR with elevated INR. Received 2u pRBCs.   - maintain 2 large bore IVs  - maintain active type and screen   - monitor CBC q24hr  - tolerating po well  - GI following 24

## 2024-02-19 NOTE — OB RN PATIENT PROFILE - NSSDOHHEADEN_OBGYN_A_OB
CC:  Breast Pain (Pt c/o right right breast pain x 1 wk. )      Hx of CC:  1+ WEEK OF UPPER OUTER RIGHT BREAST PAIN AND DENSE TISSUE. NO INJURY, CURRENTLY FINISHING PERIOD.     Vitals:    03/05/20  1505   BP: 108/66   Pulse: 71   Resp: 17   SpO2: 98%   Speedy Marshall
no

## 2024-02-19 NOTE — OB PROVIDER H&P - HISTORY OF PRESENT ILLNESS
43y  at 31w5d GA by LMP who presents to L&D for high blood pressure. Pt reports she was seen for her routine prenatal appointment today and was found to have BPs 140-150s/80-90s. Pt endorses LE swelling and bilateral ear fullness for the past 2 weeks. Denies HA, vision changes, RUQ pain. Denies contractions, leakage of fluid, vaginal bleeding. Endorses positive fetal movements. No history of elevated BPs during this pregnancy. Pt does note in her first pregnancy in  she was admitted for observation for elevated BP for ~24 hours and then was discharged. She did not get a diagnosis of gHTN or PEC at that time and then delivered at 40 weeks.   BRANDON: 24  LMP: 23    Prenatal course uncomplicated.    Receives prenatal care at [facility]      POB: :   PGYN: -fibroids, -ovarian cysts, denies STD hx, denies abnormal PAPs   PMH: Denies  PSH: Denies  SH: Denies EtOH, tobacco and illicit drug use during this pregnancy; feels safe at home   Meds: PNVs  Allergies: NKDA    Sono: vtx, anterior  EFW: 1026g, 12%ile ()              A/P:   -Admit to L&D  -Consent  -Admission labs  -NPO, except ice chips   -IV fluids  -Labor: Intact/*ROM. Latent/Active labor. Ann *.   -Fetus: Cat I tracing. Continuous toco and fetal monitoring.   -GBS: Negative, no GBS ppx required   -Analgesia:   -DVT ppx: Ambulate and SCD's while in bed     Discussed with Dr. You  43y  at 31w5d GA by LMP who presents to L&D for high blood pressure. Pt reports she was seen for her routine prenatal appointment today and was found to have BPs 140-150s/80-90s. Pt endorses LE swelling and bilateral ear fullness for the past 2 weeks. Denies HA, vision changes, RUQ pain. Denies contractions, leakage of fluid, vaginal bleeding. Endorses positive fetal movements. No history of elevated BPs during this pregnancy. Pt does note in her first pregnancy in  she was admitted for observation for elevated BP for ~24 hours and then was discharged. She did not get a diagnosis of gHTN or PEC at that time and then delivered at 40 weeks.   BRANDON: 24  LMP: 23    Prenatal course complicated by:   AMA  Obesity      Receives prenatal care at [facility]  POB: : 3/4/03 FT  uncomplicated, Male, 6lbs  G2: 11 FT  uncomplicated, Male, 8lbs  SABx3 w/ D&C for first 2 SABs and medication on 3rd SAB (, , )  PGYN: -fibroids, + ovarian cysts, denies STD hx, + h/o ASCUS pap, s/p benign colposcopy   PMH: Denies  PSH: D&Cx2  SH: Denies EtOH, tobacco and illicit drug use during this pregnancy; feels safe at home   Meds: PNVs, ASA  Allergies: possible allergy to cytotec, reports rash on one hand after taking buccal cytotec for SAB    Sono: vtx, anterior  EFW: 1026g, 12%ile ()

## 2024-02-20 ENCOUNTER — TRANSCRIPTION ENCOUNTER (OUTPATIENT)
Age: 44
End: 2024-02-20

## 2024-02-20 DIAGNOSIS — O99.213 OBESITY COMPLICATING PREGNANCY, THIRD TRIMESTER: ICD-10-CM

## 2024-02-20 LAB
ALBUMIN SERPL ELPH-MCNC: 3.6 G/DL — SIGNIFICANT CHANGE UP (ref 3.3–5.2)
ALP SERPL-CCNC: 106 U/L — SIGNIFICANT CHANGE UP (ref 40–120)
ALT FLD-CCNC: 24 U/L — SIGNIFICANT CHANGE UP
ANION GAP SERPL CALC-SCNC: 15 MMOL/L — SIGNIFICANT CHANGE UP (ref 5–17)
AST SERPL-CCNC: 28 U/L — SIGNIFICANT CHANGE UP
BASOPHILS # BLD AUTO: 0.02 K/UL — SIGNIFICANT CHANGE UP (ref 0–0.2)
BASOPHILS NFR BLD AUTO: 0.2 % — SIGNIFICANT CHANGE UP (ref 0–2)
BILIRUB SERPL-MCNC: <0.2 MG/DL — LOW (ref 0.4–2)
BUN SERPL-MCNC: 8 MG/DL — SIGNIFICANT CHANGE UP (ref 8–20)
CALCIUM SERPL-MCNC: 8 MG/DL — LOW (ref 8.4–10.5)
CHLORIDE SERPL-SCNC: 100 MMOL/L — SIGNIFICANT CHANGE UP (ref 96–108)
CO2 SERPL-SCNC: 18 MMOL/L — LOW (ref 22–29)
CREAT SERPL-MCNC: 0.41 MG/DL — LOW (ref 0.5–1.3)
EGFR: 127 ML/MIN/1.73M2 — SIGNIFICANT CHANGE UP
EOSINOPHIL # BLD AUTO: 0 K/UL — SIGNIFICANT CHANGE UP (ref 0–0.5)
EOSINOPHIL NFR BLD AUTO: 0 % — SIGNIFICANT CHANGE UP (ref 0–6)
GLUCOSE SERPL-MCNC: 136 MG/DL — HIGH (ref 70–99)
HCT VFR BLD CALC: 35.8 % — SIGNIFICANT CHANGE UP (ref 34.5–45)
HGB BLD-MCNC: 12.3 G/DL — SIGNIFICANT CHANGE UP (ref 11.5–15.5)
HIV 1+2 AB+HIV1 P24 AG SERPL QL IA: SIGNIFICANT CHANGE UP
IMM GRANULOCYTES NFR BLD AUTO: 0.5 % — SIGNIFICANT CHANGE UP (ref 0–0.9)
LYMPHOCYTES # BLD AUTO: 1.24 K/UL — SIGNIFICANT CHANGE UP (ref 1–3.3)
LYMPHOCYTES # BLD AUTO: 13 % — SIGNIFICANT CHANGE UP (ref 13–44)
MAGNESIUM SERPL-MCNC: 3.9 MG/DL — HIGH (ref 1.6–2.6)
MAGNESIUM SERPL-MCNC: 4.2 MG/DL — HIGH (ref 1.6–2.6)
MAGNESIUM SERPL-MCNC: 4.5 MG/DL — HIGH (ref 1.6–2.6)
MAGNESIUM SERPL-MCNC: 4.5 MG/DL — HIGH (ref 1.6–2.6)
MCHC RBC-ENTMCNC: 29.3 PG — SIGNIFICANT CHANGE UP (ref 27–34)
MCHC RBC-ENTMCNC: 34.4 GM/DL — SIGNIFICANT CHANGE UP (ref 32–36)
MCV RBC AUTO: 85.2 FL — SIGNIFICANT CHANGE UP (ref 80–100)
MONOCYTES # BLD AUTO: 0.22 K/UL — SIGNIFICANT CHANGE UP (ref 0–0.9)
MONOCYTES NFR BLD AUTO: 2.3 % — SIGNIFICANT CHANGE UP (ref 2–14)
NEUTROPHILS # BLD AUTO: 7.98 K/UL — HIGH (ref 1.8–7.4)
NEUTROPHILS NFR BLD AUTO: 84 % — HIGH (ref 43–77)
PLATELET # BLD AUTO: 401 K/UL — HIGH (ref 150–400)
POTASSIUM SERPL-MCNC: 4.3 MMOL/L — SIGNIFICANT CHANGE UP (ref 3.5–5.3)
POTASSIUM SERPL-SCNC: 4.3 MMOL/L — SIGNIFICANT CHANGE UP (ref 3.5–5.3)
PROT SERPL-MCNC: 6.9 G/DL — SIGNIFICANT CHANGE UP (ref 6.6–8.7)
RBC # BLD: 4.2 M/UL — SIGNIFICANT CHANGE UP (ref 3.8–5.2)
RBC # FLD: 14.1 % — SIGNIFICANT CHANGE UP (ref 10.3–14.5)
SODIUM SERPL-SCNC: 132 MMOL/L — LOW (ref 135–145)
T PALLIDUM AB TITR SER: NEGATIVE — SIGNIFICANT CHANGE UP
WBC # BLD: 9.51 K/UL — SIGNIFICANT CHANGE UP (ref 3.8–10.5)
WBC # FLD AUTO: 9.51 K/UL — SIGNIFICANT CHANGE UP (ref 3.8–10.5)

## 2024-02-20 PROCEDURE — 99233 SBSQ HOSP IP/OBS HIGH 50: CPT | Mod: GC

## 2024-02-20 RX ORDER — SODIUM CHLORIDE 9 MG/ML
1000 INJECTION, SOLUTION INTRAVENOUS
Refills: 0 | Status: DISCONTINUED | OUTPATIENT
Start: 2024-02-20 | End: 2024-02-21

## 2024-02-20 RX ORDER — SODIUM CHLORIDE 9 MG/ML
3 INJECTION INTRAMUSCULAR; INTRAVENOUS; SUBCUTANEOUS EVERY 8 HOURS
Refills: 0 | Status: DISCONTINUED | OUTPATIENT
Start: 2024-02-20 | End: 2024-03-09

## 2024-02-20 RX ORDER — ACETAMINOPHEN 500 MG
975 TABLET ORAL ONCE
Refills: 0 | Status: COMPLETED | OUTPATIENT
Start: 2024-02-20 | End: 2024-02-20

## 2024-02-20 RX ADMIN — Medication 975 MILLIGRAM(S): at 13:05

## 2024-02-20 RX ADMIN — Medication 30 MILLIGRAM(S): at 22:07

## 2024-02-20 RX ADMIN — Medication 975 MILLIGRAM(S): at 21:07

## 2024-02-20 RX ADMIN — SODIUM CHLORIDE 3 MILLILITER(S): 9 INJECTION INTRAMUSCULAR; INTRAVENOUS; SUBCUTANEOUS at 22:07

## 2024-02-20 RX ADMIN — Medication 12 MILLIGRAM(S): at 20:20

## 2024-02-20 RX ADMIN — Medication 975 MILLIGRAM(S): at 14:09

## 2024-02-20 NOTE — PROGRESS NOTE ADULT - PROBLEM SELECTOR PLAN 1
- Dating by LMP  - Last EFW  1026g, 12%ile, for rpt MFM growth sonogram tomorrow  - Daily pnv  - Do not anticipate delivery at this time, but if indicated for delivery <32w, would continue Mg for neuroprotection  - Currently GBS unknown, GBS ordered, if delivery indicated prior to results, would start amp for ppx  - Betamethasone ordered for fetal lung maturity in anticipation of  delivery. - Dating by last menstrual period  - Last EFW  1026g, 12%ile, for reapeat MFM growth sonogram tomorrow  - Daily prenatal vitamins  - Do not anticipate imminent delivery at this time, but if indicated for delivery <32w, would continue magnesium for neuroprotection  - Currently GBS unknown, GBS ordered, if delivery indicated prior to results, would start ampicllin for GBS ppx  - Betamethasone ordered for fetal lung maturity in anticipation of  delivery. - Dating by last menstrual period  - Last EFW  1026g, 12%ile, for repeat growth sonogram tomorrow  - Daily prenatal vitamins  - Do not anticipate imminent delivery at this time, but if indicated for delivery <32w, would continue magnesium for neuroprotection  - Currently GBS unknown, GBS ordered, if delivery indicated prior to results, would start ampicllin for GBS ppx  - Betamethasone ordered for fetal lung maturity in anticipation of  delivery. - Dating by last menstrual period  - Last EFW  1026g, 12%ile, for repeat growth sonogram tomorrow  - Daily prenatal vitamins  - Do not anticipate imminent delivery at this time, but if delivery <32w, would continue magnesium for neuroprotection  - Currently GBS status is unknown, GBS culture ordered, if delivery indicated prior to results, would start ampicillin for GBS ppx  - Betamethasone ordered for fetal lung maturity in anticipation of a  delivery

## 2024-02-20 NOTE — PROGRESS NOTE ADULT - PROBLEM SELECTOR PLAN 4
- Daily pnv  - Keep type and screen active  - DVT ppx: encourage ambulation, SCDs when in bed, heparin subq to start on HD#3  - FHT reactive, continuous NST while on Mg then can possibly transition to NST BID, will evaluate at that time. - Daily prenatal vitamins  - Keep type and screen active  - DVT ppx: encourage ambulation, SCDs when in bed, heparin subq to start on HD#3  - FHT reactive, continuous NST while on Mg then can possibly transition to NST BID, will evaluate at that time.

## 2024-02-20 NOTE — PROGRESS NOTE ADULT - SUBJECTIVE AND OBJECTIVE BOX
YOLY JERONIMO  [facility] DELV OBWR 02    A 43year old  at 31w6d admitted for preeclampsia with severe features on magnesium s/p labetalol IV push 20 mg, 40 mg, s/p BMZ#1 on continuous monitoring.    Subjective:  She reports good fetal movement. She denies contractions, leakage of fluid, vaginal bleeding.   She reports mild headache she attributes to not eating - denies wanting medications. She denies, visual disturbances, RUQ pain, epigastric pain and new-onset edema. Denies drowsiness, somnolence and SOB.    Vital Signs:  Vital Signs Last 24 Hrs  T(C): 36.1 (2024 04:01), Max: 36.3 (2024 18:31)  T(F): 96.98 (2024 04:01), Max: 97.3 (2024 18:31)  HR: 107 (2024 07:05) (78 - 107)  BP: 137/69 (2024 07:05) (114/65 - 190/94)  BP(mean): --  RR: 16 (2024 02:00) (16 - 18)  SpO2: --    Parameters below as of 2024 19:27  Patient On (Oxygen Delivery Method): room air      Height (cm): 165.1 (24 @ 19:27)  Weight (kg): 99.8 (24 @ 19:27)  BMI (kg/m2): 36.6 (24 @ 19:27)  BSA (m2): 2.06 (24 @ 19:27)    Physical Exam:  General: Adult female in NAD  Lungs: CTAB, no wheezing, rhonchi or rales  Abdomen: soft, non-tender, gravid uterus  Pelvic: Deferred  Ext: 1+ edema. No cyanosis, calf tenderness  Skin: No rashes or lesions on exposed skin  Neuro: Normal DTRs, grossly intact    Labs:                          12.6   8.19  )-----------( 380      ( 2024 19:00 )             36.5         137  |  103  |  6.6<L>  ----------------------------<  124<H>  3.7   |  19.0<L>  |  0.38<L>    Ca    9.0      2024 19:00  Mg     3.9     02-20    TPro  6.7  /  Alb  3.5  /  TBili  <0.2<L>  /  DBili  x   /  AST  27  /  ALT  23  /  AlkPhos  104  02-    PT/INR - ( 2024 19:00 )   PT: 9.9 sec;   INR: 0.89 ratio         PTT - ( 2024 19:00 )  PTT:27.3 sec        Radiology:    MEDICATIONS  (STANDING):  betamethasone Injectable 12 milliGRAM(s) IntraMuscular every 24 hours  chlorhexidine 2% Cloths 1 Application(s) Topical daily  citric acid/sodium citrate Solution 30 milliLiter(s) Oral once  influenza   Vaccine 0.5 milliLiter(s) IntraMuscular once  lactated ringers. 1000 milliLiter(s) (125 mL/Hr) IV Continuous <Continuous>  magnesium sulfate Infusion 2 Gm/Hr (50 mL/Hr) IV Continuous <Continuous>  NIFEdipine XL 30 milliGRAM(s) Oral daily  oxytocin Infusion 333.333 milliUNIT(s)/Min (1000 mL/Hr) IV Continuous <Continuous>    MEDICATIONS  (PRN):  calcium carbonate    500 mG (Tums) Chewable 1 Tablet(s) Chew every 6 hours PRN Heartburn   YOLY JERONIMO  [facility] DELV OBWR 02    43 year old  at 31w6d by last menstrual period 23, estimated due date 24 admitted for preeclampsia with severe features on magnesium, given labetalol IV push 20 mg, 40 mg now on standing Procardia 30XL, on continuous monitoring, received betamethasone #1  @2030.    Subjective:  She reports good fetal movement. She denies contractions, leakage of fluid, vaginal bleeding.   She reports mild headache she attributes to not eating - denies wanting medications. She denies, visual disturbances, RUQ pain, epigastric pain and new-onset edema. Denies drowsiness, somnolence and SOB.    Vital Signs:  Vital Signs Last 24 Hrs  T(C): 36.1 (2024 04:01), Max: 36.3 (2024 18:31)  T(F): 96.98 (2024 04:01), Max: 97.3 (2024 18:31)  HR: 107 (2024 07:05) (78 - 107)  BP: 137/69 (2024 07:05) (114/65 - 190/94)  BP(mean): --  RR: 16 (2024 02:00) (16 - 18)  SpO2: --    Parameters below as of 2024 19:27  Patient On (Oxygen Delivery Method): room air      Height (cm): 165.1 (24 @ 19:27)  Weight (kg): 99.8 (24 @ 19:27)  BMI (kg/m2): 36.6 (24 @ 19:27)  BSA (m2): 2.06 (24 @ 19:27)    Physical Exam:  General: Adult female in NAD  Lungs: CTAB, no wheezing, rhonchi or rales  Abdomen: soft, non-tender, gravid uterus  Pelvic: Deferred  Ext: 1+ edema. No cyanosis, calf tenderness  Skin: No rashes or lesions on exposed skin  Neuro: Normal DTRs, grossly intact    Labs:                          12.6   8.19  )-----------( 380      ( 2024 19:00 )             36.5     02-19    137  |  103  |  6.6<L>  ----------------------------<  124<H>  3.7   |  19.0<L>  |  0.38<L>    Ca    9.0      2024 19:00  Mg     3.9         TPro  6.7  /  Alb  3.5  /  TBili  <0.2<L>  /  DBili  x   /  AST  27  /  ALT  23  /  AlkPhos  104      PT/INR - ( 2024 19:00 )   PT: 9.9 sec;   INR: 0.89 ratio         PTT - ( 2024 19:00 )  PTT:27.3 sec        Radiology:    MEDICATIONS  (STANDING):  betamethasone Injectable 12 milliGRAM(s) IntraMuscular every 24 hours  chlorhexidine 2% Cloths 1 Application(s) Topical daily  citric acid/sodium citrate Solution 30 milliLiter(s) Oral once  influenza   Vaccine 0.5 milliLiter(s) IntraMuscular once  lactated ringers. 1000 milliLiter(s) (125 mL/Hr) IV Continuous <Continuous>  magnesium sulfate Infusion 2 Gm/Hr (50 mL/Hr) IV Continuous <Continuous>  NIFEdipine XL 30 milliGRAM(s) Oral daily  oxytocin Infusion 333.333 milliUNIT(s)/Min (1000 mL/Hr) IV Continuous <Continuous>    MEDICATIONS  (PRN):  calcium carbonate    500 mG (Tums) Chewable 1 Tablet(s) Chew every 6 hours PRN Heartburn   YOLY JERONIMO  [facility] DELV OBWR 02    43 year old  at 31w6d by last menstrual period 23, estimated due date 24. She was admitted for having preeclampsia with severe features. Given magnesium sulfate Rx. Given labetalol IV push 20 mg, and 40 mg. Started on standing Procardia 30XL daily. She is on continuous fetal monitoring. She received betamethasone #1  @2030.    Subjective:  She reports good fetal movement. She denies contractions, leakage of fluid, vaginal bleeding.   She reports mild headache she attributes to not eating - denies wanting medications. She denies, visual disturbances, RUQ pain, epigastric pain and new-onset edema. Denies drowsiness, somnolence and SOB.    Vital Signs:  Vital Signs Last 24 Hrs  T(C): 36.1 (2024 04:01), Max: 36.3 (2024 18:31)  T(F): 96.98 (2024 04:01), Max: 97.3 (2024 18:31)  HR: 107 (2024 07:05) (78 - 107)  BP: 137/69 (2024 07:05) (114/65 - 190/94)  BP(mean): --  RR: 16 (2024 02:00) (16 - 18)  SpO2: --    Parameters below as of 2024 19:27  Patient On (Oxygen Delivery Method): room air    Height (cm): 165.1 (24 @ 19:27)  Weight (kg): 99.8 (24 @ 19:27)  BMI (kg/m2): 36.6 (24 @ 19:27)  BSA (m2): 2.06 (24 @ 19:27)    Physical Exam:  General: Adult female in NAD  Lungs: CTAB, no wheezing, rhonchi or rales  Abdomen: soft, non-tender, gravid uterus  Pelvic: Deferred  Ext: 1+ edema. No cyanosis, calf tenderness  Skin: No rashes or lesions on exposed skin  Neuro: Normal DTRs, grossly intact    Labs:                         12.6   8.19  )-----------( 380      ( 2024 19:00 )             36.5     02-    137  |  103  |  6.6<L>  ----------------------------<  124<H>  3.7   |  19.0<L>  |  0.38<L>    Ca    9.0      2024 19:00  Mg     3.9         TPro  6.7  /  Alb  3.5  /  TBili  <0.2<L>  /  DBili  x   /  AST  27  /  ALT  23  /  AlkPhos  104      PT/INR - ( 2024 19:00 )   PT: 9.9 sec;   INR: 0.89 ratio       PTT - ( 2024 19:00 )  PTT:27.3 sec    MEDICATIONS  (STANDING):  betamethasone Injectable 12 milliGRAM(s) IntraMuscular every 24 hours  chlorhexidine 2% Cloths 1 Application(s) Topical daily  citric acid/sodium citrate Solution 30 milliLiter(s) Oral once  influenza   Vaccine 0.5 milliLiter(s) IntraMuscular once  lactated ringers. 1000 milliLiter(s) (125 mL/Hr) IV Continuous <Continuous>  magnesium sulfate Infusion 2 Gm/Hr (50 mL/Hr) IV Continuous <Continuous>  NIFEdipine XL 30 milliGRAM(s) Oral daily  oxytocin Infusion 333.333 milliUNIT(s)/Min (1000 mL/Hr) IV Continuous <Continuous>    MEDICATIONS  (PRN):  calcium carbonate    500 mG (Tums) Chewable 1 Tablet(s) Chew every 6 hours PRN Heartburn

## 2024-02-20 NOTE — PROGRESS NOTE ADULT - ASSESSMENT
A 43year old  at 31w6d admitted for preeclampsia with severe features on magnesium s/p labetalol IV push 20 mg, 40 mg, s/p BMZ#1 on continuous monitoring. 43 year old  at 31w6d by last menstrual period 23, estimated due date 24 admitted for preeclampsia with severe features on magnesium, given labetalol IV push 20 mg, 40 mg now on standing Procardia 30XL, on continuous monitoring, received betamethasone #1  @. 43 year old  at 31w6d by last menstrual period 23, estimated due date 24. She has preeclampsia with severe features.

## 2024-02-20 NOTE — DISCHARGE NOTE ANTEPARTUM - PATIENT PORTAL LINK FT
You can access the FollowMyHealth Patient Portal offered by [facility] by registering at the following website: [email or web address]. By joining [employer]’s FollowMyHealth portal, you will also be able to view your health information using other applications (apps) compatible with our system.

## 2024-02-20 NOTE — PROGRESS NOTE ADULT - PROBLEM SELECTOR PLAN 5
Obesity in pregnancy increases the risk of preeclampsia, as well as complications with delivery including arrest of dilation/descent, and postpartum hemorrhage.

## 2024-02-20 NOTE — PROGRESS NOTE ADULT - PROBLEM SELECTOR PLAN 2
- Multiple severe range BPs in triage, BPs responded to IVP labetalol 20/40mg  - Mg started, continue for 24h for seizure ppx   - Labs unremarkable, p/c 0.2  - BPs currently moderately hypertensive, will start procardia 30mg qd and continue to monitor closely  - Prior NSVDx2, plan for IOL at 34w if cephalic presentation 2/2 PECwSF unless indicated sooner due to labs or uncontrolled BPs. - Multiple severe range blood pressures in triage, responded to IVP labetalol 20/40mg, now on standing Procardia 30XL  - Magnesium started, continue for 24h for seizure ppx   - Labs unremarkable, PC ratio 0.2  - Prior NSVDx2, plan for induction of labor at 34w if cephalic presentation secondary to preeclampsia with severe features unless indicated sooner due to labs or uncontrolled blood pressures. - Multiple severe range blood pressures in triage, responded to IVP labetalol 20/40mg, now on Procardia 30XL qhs. Titrate to maintain blood pressures <140/90 as able.   - Magnesium started, continue for 24h for seizure ppx   - Labs unremarkable, PC ratio 0.2

## 2024-02-20 NOTE — PROGRESS NOTE ADULT - PROBLEM SELECTOR PLAN 3
AMA is a risk factor for adverse maternal, fetal, and  outcomes. With delivery at age 40 years or older, there are increased rates of chromosomal abnormalities and aneuploidy, congenital anomalies, LGA and SGA neonates,  morbidity and stillbirth, GDM, PEC, labor dystocia, and  delivery.   - Her NIPS was LR  - She was prescribed ASA ~25w GA, but she has only been taking it 3x/wk  - Will rpt growth sonogram tomorrow, most recent () was 12%ile. AMA is a risk factor for adverse maternal, fetal, and  outcomes. With delivery at age 40 years or older, there are increased rates of chromosomal abnormalities and aneuploidy, congenital anomalies, LGA and SGA neonates,  morbidity and stillbirth, GDM, PEC, labor dystocia, and  delivery.   - Her NIPS was low risk  - She was prescribed ASA ~25w GA, but she has only been taking it 3x/wk  - Will rpt growth sonogram tomorrow, most recent () was 12%ile. AMA is a risk factor for adverse maternal, fetal, and  outcomes. With delivery at age 40 years or older, there are increased rates of chromosomal abnormalities and aneuploidy, congenital anomalies, LGA and SGA neonates,  morbidity and stillbirth, GDM, PEC, labor dystocia, and  delivery.   - NIPS was low risk  - She was prescribed ASA ~25w GA, but she has only been taking it 3x/wk. Aspirin no longer indicated in light of preeclampsia diagnosis

## 2024-02-21 ENCOUNTER — APPOINTMENT (OUTPATIENT)
Dept: ANTEPARTUM | Facility: HOSPITAL | Age: 44
End: 2024-02-21
Payer: MEDICAID

## 2024-02-21 DIAGNOSIS — Z3A.32 32 WEEKS GESTATION OF PREGNANCY: ICD-10-CM

## 2024-02-21 LAB
ALBUMIN SERPL ELPH-MCNC: 3.2 G/DL — LOW (ref 3.3–5.2)
ALP SERPL-CCNC: 94 U/L — SIGNIFICANT CHANGE UP (ref 40–120)
ALT FLD-CCNC: 13 U/L — SIGNIFICANT CHANGE UP
ANION GAP SERPL CALC-SCNC: 15 MMOL/L — SIGNIFICANT CHANGE UP (ref 5–17)
APTT BLD: 24.4 SEC — LOW (ref 24.5–35.6)
AST SERPL-CCNC: 17 U/L — SIGNIFICANT CHANGE UP
BASOPHILS # BLD AUTO: 0.01 K/UL — SIGNIFICANT CHANGE UP (ref 0–0.2)
BASOPHILS NFR BLD AUTO: 0.1 % — SIGNIFICANT CHANGE UP (ref 0–2)
BILIRUB SERPL-MCNC: <0.2 MG/DL — LOW (ref 0.4–2)
BUN SERPL-MCNC: 8.4 MG/DL — SIGNIFICANT CHANGE UP (ref 8–20)
CALCIUM SERPL-MCNC: 8.2 MG/DL — LOW (ref 8.4–10.5)
CHLORIDE SERPL-SCNC: 103 MMOL/L — SIGNIFICANT CHANGE UP (ref 96–108)
CO2 SERPL-SCNC: 17 MMOL/L — LOW (ref 22–29)
CREAT SERPL-MCNC: 0.36 MG/DL — LOW (ref 0.5–1.3)
EGFR: 131 ML/MIN/1.73M2 — SIGNIFICANT CHANGE UP
EOSINOPHIL # BLD AUTO: 0 K/UL — SIGNIFICANT CHANGE UP (ref 0–0.5)
EOSINOPHIL NFR BLD AUTO: 0 % — SIGNIFICANT CHANGE UP (ref 0–6)
FIBRINOGEN PPP-MCNC: 503 MG/DL — HIGH (ref 200–450)
GLUCOSE SERPL-MCNC: 196 MG/DL — HIGH (ref 70–99)
GROUP B BETA STREP DNA (PCR): DETECTED
HCT VFR BLD CALC: 33.4 % — LOW (ref 34.5–45)
HGB BLD-MCNC: 11.2 G/DL — LOW (ref 11.5–15.5)
IMM GRANULOCYTES NFR BLD AUTO: 1.7 % — HIGH (ref 0–0.9)
INR BLD: 0.85 RATIO — SIGNIFICANT CHANGE UP (ref 0.85–1.18)
LYMPHOCYTES # BLD AUTO: 1.41 K/UL — SIGNIFICANT CHANGE UP (ref 1–3.3)
LYMPHOCYTES # BLD AUTO: 14.5 % — SIGNIFICANT CHANGE UP (ref 13–44)
MCHC RBC-ENTMCNC: 28.6 PG — SIGNIFICANT CHANGE UP (ref 27–34)
MCHC RBC-ENTMCNC: 33.5 GM/DL — SIGNIFICANT CHANGE UP (ref 32–36)
MCV RBC AUTO: 85.4 FL — SIGNIFICANT CHANGE UP (ref 80–100)
MONOCYTES # BLD AUTO: 0.29 K/UL — SIGNIFICANT CHANGE UP (ref 0–0.9)
MONOCYTES NFR BLD AUTO: 3 % — SIGNIFICANT CHANGE UP (ref 2–14)
NEUTROPHILS # BLD AUTO: 7.85 K/UL — HIGH (ref 1.8–7.4)
NEUTROPHILS NFR BLD AUTO: 80.7 % — HIGH (ref 43–77)
PLATELET # BLD AUTO: 378 K/UL — SIGNIFICANT CHANGE UP (ref 150–400)
POTASSIUM SERPL-MCNC: 4.2 MMOL/L — SIGNIFICANT CHANGE UP (ref 3.5–5.3)
POTASSIUM SERPL-SCNC: 4.2 MMOL/L — SIGNIFICANT CHANGE UP (ref 3.5–5.3)
PROT SERPL-MCNC: 6.2 G/DL — LOW (ref 6.6–8.7)
PROTHROM AB SERPL-ACNC: 9.5 SEC — SIGNIFICANT CHANGE UP (ref 9.5–13)
RBC # BLD: 3.91 M/UL — SIGNIFICANT CHANGE UP (ref 3.8–5.2)
RBC # FLD: 14.3 % — SIGNIFICANT CHANGE UP (ref 10.3–14.5)
SODIUM SERPL-SCNC: 135 MMOL/L — SIGNIFICANT CHANGE UP (ref 135–145)
SOURCE GROUP B STREP: SIGNIFICANT CHANGE UP
URATE SERPL-MCNC: 2.2 MG/DL — LOW (ref 2.4–5.7)
WBC # BLD: 9.73 K/UL — SIGNIFICANT CHANGE UP (ref 3.8–10.5)
WBC # FLD AUTO: 9.73 K/UL — SIGNIFICANT CHANGE UP (ref 3.8–10.5)

## 2024-02-21 PROCEDURE — 99232 SBSQ HOSP IP/OBS MODERATE 35: CPT | Mod: GC

## 2024-02-21 PROCEDURE — 76816 OB US FOLLOW-UP PER FETUS: CPT | Mod: 26

## 2024-02-21 RX ORDER — HEPARIN SODIUM 5000 [USP'U]/ML
5000 INJECTION INTRAVENOUS; SUBCUTANEOUS EVERY 12 HOURS
Refills: 0 | Status: DISCONTINUED | OUTPATIENT
Start: 2024-02-22 | End: 2024-03-06

## 2024-02-21 RX ORDER — NIFEDIPINE 30 MG
30 TABLET, EXTENDED RELEASE 24 HR ORAL EVERY 24 HOURS
Refills: 0 | Status: DISCONTINUED | OUTPATIENT
Start: 2024-02-21 | End: 2024-03-06

## 2024-02-21 RX ADMIN — Medication 1 TABLET(S): at 15:53

## 2024-02-21 RX ADMIN — Medication 30 MILLIGRAM(S): at 21:33

## 2024-02-21 RX ADMIN — SODIUM CHLORIDE 3 MILLILITER(S): 9 INJECTION INTRAMUSCULAR; INTRAVENOUS; SUBCUTANEOUS at 21:34

## 2024-02-21 NOTE — PROGRESS NOTE ADULT - ASSESSMENT
43 year old  at 32w0d by last menstrual period 23, estimated due date 24 admitted for preeclampsia with severe features now s/p 24h course of magnesium, s/p labetalol IV push 20 mg, 40 mg, on standing Procardia 30XL, s/p BMZ (-)

## 2024-02-21 NOTE — PROGRESS NOTE ADULT - PROBLEM SELECTOR PLAN 1
- Dating by last menstrual period  - Last EFW 1/24 1026g, 12%ile, for repeat growth sonogram today  - Daily prenatal vitamins  - Currently GBS unknown, GBS ordered, if delivery indicated prior to results, would start ampicllin for GBS ppx  - Received betamethasone course 2/19-2/20

## 2024-02-21 NOTE — PROGRESS NOTE ADULT - PROBLEM SELECTOR PLAN 3
AMA is a risk factor for adverse maternal, fetal, and  outcomes. With delivery at age 40 years or older, there are increased rates of chromosomal abnormalities and aneuploidy, congenital anomalies, LGA and SGA neonates,  morbidity and stillbirth, GDM, PEC, labor dystocia, and  delivery.   - NIPS was low risk  - She was prescribed ASA ~25w GA, but she has only been taking it 3x/wk. Aspirin no longer indicated in light of preeclampsia diagnosis

## 2024-02-21 NOTE — PROGRESS NOTE ADULT - PROBLEM SELECTOR PLAN 5
Obesity in pregnancy increases the risk of preeclampsia, as well as complications with delivery including arrest of dilation/descent, and postpartum hemorrhage. kyphosis

## 2024-02-21 NOTE — PROGRESS NOTE ADULT - PROBLEM SELECTOR PLAN 2
- Multiple severe range blood pressures in triage, responded to IVP labetalol 20/40mg, now on Procardia 30XL qhs. Titrate to maintain blood pressures <140/90 as able.   - Received 24h of magnesium for seizure prophylaxis  - Admit labs and repeat labs 2/20, 2/21 unremarkable, PC ratio 0.2

## 2024-02-21 NOTE — PROGRESS NOTE ADULT - PROBLEM SELECTOR PLAN 4
- Daily prenatal vitamins  - Keep type and screen active  - DVT ppx: encourage ambulation, SCDs when in bed, heparin subq to start on HD#3  - NST BID

## 2024-02-21 NOTE — PROGRESS NOTE ADULT - SUBJECTIVE AND OBJECTIVE BOX
YOLY JERONIMO  [facility] DELV OBWR 02    43 year old  at 32w0d by last menstrual period 23, estimated due date 24 admitted for preeclampsia with severe features now s/p 24h course of magnesium, s/p labetalol IV push 20 mg, 40 mg, on standing Procardia 30XL, s/p BMZ (-)    Subjective:  She reports good fetal movement. She denies contractions, leakage of fluid, vaginal bleeding.   She denies headaches, visual disturbances, RUQ pain, epigastric pain and new-onset edema. Denies drowsiness, somnolence and SOB.    Vital Signs Last 24 Hrs  T(C): 37.2 (2024 00:12), Max: 37.2 (2024 00:12)  T(F): 98.9 (2024 00:12), Max: 98.9 (2024 00:12)  HR: 85 (2024 04:16) (85 - 114)  BP: 119/72 (2024 04:16) (119/72 - 152/94)  BP(mean): --  RR: 20 (2024 04:16) (17 - 20)  SpO2: 95% (2024 04:16) (95% - 97%)    Parameters below as of 2024 04:16  Patient On (Oxygen Delivery Method): room air      Height (cm): 165.1 (24 @ 19:27)  Weight (kg): 99.8 (24 @ 19:27)  BMI (kg/m2): 36.6 (24 @ 19:27)  BSA (m2): 2.06 (24 @ 19:27)    Physical Exam:  General: Adult female in NAD  Lungs: CTAB, no wheezing, rhonchi or rales  Abdomen: soft, non-tender, gravid uterus  Pelvic: Deferred  Ext: 1+ edema. No cyanosis, calf tenderness  Skin: No rashes or lesions on exposed skin  Neuro: Normal DTRs, grossly intact    Labs:                        11.2   9.73  )-----------( 378      ( 2024 05:56 )             33.4         132<L>  |  100  |  8.0  ----------------------------<  136<H>  4.3   |  18.0<L>  |  0.41<L>    Ca    8.0<L>      2024 09:45  Mg     4.2         TPro  6.9  /  Alb  3.6  /  TBili  <0.2<L>  /  DBili  x   /  AST  28  /  ALT  24  /  AlkPhos  106      MEDICATIONS  (STANDING):  lactated ringers. 1000 milliLiter(s) (75 mL/Hr) IV Continuous <Continuous>  magnesium sulfate Infusion 2 Gm/Hr (50 mL/Hr) IV Continuous <Continuous>  NIFEdipine XL 30 milliGRAM(s) Oral daily  prenatal multivitamin 1 Tablet(s) Oral daily  sodium chloride 0.9% lock flush 3 milliLiter(s) IV Push every 8 hours    MEDICATIONS  (PRN):  calcium carbonate    500 mG (Tums) Chewable 1 Tablet(s) Chew every 6 hours PRN Heartburn

## 2024-02-22 ENCOUNTER — APPOINTMENT (OUTPATIENT)
Dept: ANTEPARTUM | Facility: CLINIC | Age: 44
End: 2024-02-22

## 2024-02-22 LAB
ALBUMIN SERPL ELPH-MCNC: 3.3 G/DL — SIGNIFICANT CHANGE UP (ref 3.3–5.2)
ALP SERPL-CCNC: 92 U/L — SIGNIFICANT CHANGE UP (ref 40–120)
ALT FLD-CCNC: 16 U/L — SIGNIFICANT CHANGE UP
ANION GAP SERPL CALC-SCNC: 12 MMOL/L — SIGNIFICANT CHANGE UP (ref 5–17)
AST SERPL-CCNC: 14 U/L — SIGNIFICANT CHANGE UP
BASOPHILS # BLD AUTO: 0 K/UL — SIGNIFICANT CHANGE UP (ref 0–0.2)
BASOPHILS NFR BLD AUTO: 0 % — SIGNIFICANT CHANGE UP (ref 0–2)
BILIRUB SERPL-MCNC: <0.2 MG/DL — LOW (ref 0.4–2)
BLD GP AB SCN SERPL QL: SIGNIFICANT CHANGE UP
BUN SERPL-MCNC: 8.9 MG/DL — SIGNIFICANT CHANGE UP (ref 8–20)
CALCIUM SERPL-MCNC: 8.5 MG/DL — SIGNIFICANT CHANGE UP (ref 8.4–10.5)
CHLORIDE SERPL-SCNC: 103 MMOL/L — SIGNIFICANT CHANGE UP (ref 96–108)
CO2 SERPL-SCNC: 19 MMOL/L — LOW (ref 22–29)
CREAT SERPL-MCNC: 0.33 MG/DL — LOW (ref 0.5–1.3)
EGFR: 133 ML/MIN/1.73M2 — SIGNIFICANT CHANGE UP
EOSINOPHIL # BLD AUTO: 0 K/UL — SIGNIFICANT CHANGE UP (ref 0–0.5)
EOSINOPHIL NFR BLD AUTO: 0 % — SIGNIFICANT CHANGE UP (ref 0–6)
GIANT PLATELETS BLD QL SMEAR: PRESENT — SIGNIFICANT CHANGE UP
GLUCOSE SERPL-MCNC: 101 MG/DL — HIGH (ref 70–99)
HCT VFR BLD CALC: 33.9 % — LOW (ref 34.5–45)
HGB BLD-MCNC: 11.4 G/DL — LOW (ref 11.5–15.5)
LYMPHOCYTES # BLD AUTO: 1.35 K/UL — SIGNIFICANT CHANGE UP (ref 1–3.3)
LYMPHOCYTES # BLD AUTO: 13.5 % — SIGNIFICANT CHANGE UP (ref 13–44)
MANUAL SMEAR VERIFICATION: SIGNIFICANT CHANGE UP
MCHC RBC-ENTMCNC: 28.6 PG — SIGNIFICANT CHANGE UP (ref 27–34)
MCHC RBC-ENTMCNC: 33.6 GM/DL — SIGNIFICANT CHANGE UP (ref 32–36)
MCV RBC AUTO: 85 FL — SIGNIFICANT CHANGE UP (ref 80–100)
MICROCYTES BLD QL: SLIGHT — SIGNIFICANT CHANGE UP
MONOCYTES # BLD AUTO: 0.99 K/UL — HIGH (ref 0–0.9)
MONOCYTES NFR BLD AUTO: 9.9 % — SIGNIFICANT CHANGE UP (ref 2–14)
NEUTROPHILS # BLD AUTO: 7.05 K/UL — SIGNIFICANT CHANGE UP (ref 1.8–7.4)
NEUTROPHILS NFR BLD AUTO: 70.3 % — SIGNIFICANT CHANGE UP (ref 43–77)
NRBC # BLD: 1 /100 WBCS — HIGH (ref 0–0)
OVALOCYTES BLD QL SMEAR: SLIGHT — SIGNIFICANT CHANGE UP
PLAT MORPH BLD: NORMAL — SIGNIFICANT CHANGE UP
PLATELET # BLD AUTO: 388 K/UL — SIGNIFICANT CHANGE UP (ref 150–400)
POIKILOCYTOSIS BLD QL AUTO: SLIGHT — SIGNIFICANT CHANGE UP
POLYCHROMASIA BLD QL SMEAR: SLIGHT — SIGNIFICANT CHANGE UP
POTASSIUM SERPL-MCNC: 3.8 MMOL/L — SIGNIFICANT CHANGE UP (ref 3.5–5.3)
POTASSIUM SERPL-SCNC: 3.8 MMOL/L — SIGNIFICANT CHANGE UP (ref 3.5–5.3)
PROT SERPL-MCNC: 6.1 G/DL — LOW (ref 6.6–8.7)
RBC # BLD: 3.99 M/UL — SIGNIFICANT CHANGE UP (ref 3.8–5.2)
RBC # FLD: 14.1 % — SIGNIFICANT CHANGE UP (ref 10.3–14.5)
RBC BLD AUTO: ABNORMAL
SODIUM SERPL-SCNC: 134 MMOL/L — LOW (ref 135–145)
VARIANT LYMPHS # BLD: 6.3 % — HIGH (ref 0–6)
WBC # BLD: 10.03 K/UL — SIGNIFICANT CHANGE UP (ref 3.8–10.5)
WBC # FLD AUTO: 10.03 K/UL — SIGNIFICANT CHANGE UP (ref 3.8–10.5)

## 2024-02-22 PROCEDURE — 59025 FETAL NON-STRESS TEST: CPT | Mod: 26

## 2024-02-22 PROCEDURE — 99232 SBSQ HOSP IP/OBS MODERATE 35: CPT | Mod: GC

## 2024-02-22 RX ORDER — CITRIC ACID/SODIUM CITRATE 300-500 MG
30 SOLUTION, ORAL ORAL ONCE
Refills: 0 | Status: DISCONTINUED | OUTPATIENT
Start: 2024-02-22 | End: 2024-03-07

## 2024-02-22 RX ORDER — CEFTRIAXONE 500 MG/1
1000 INJECTION, POWDER, FOR SOLUTION INTRAMUSCULAR; INTRAVENOUS EVERY 24 HOURS
Refills: 0 | Status: DISCONTINUED | OUTPATIENT
Start: 2024-02-22 | End: 2024-02-22

## 2024-02-22 RX ORDER — OXYTOCIN 10 UNIT/ML
333.33 VIAL (ML) INJECTION
Qty: 20 | Refills: 0 | Status: COMPLETED | OUTPATIENT
Start: 2024-02-22 | End: 2024-02-22

## 2024-02-22 RX ORDER — OXYTOCIN 10 UNIT/ML
VIAL (ML) INJECTION
Qty: 30 | Refills: 0 | Status: DISCONTINUED | OUTPATIENT
Start: 2024-02-22 | End: 2024-03-07

## 2024-02-22 RX ORDER — CEFTRIAXONE 500 MG/1
1000 INJECTION, POWDER, FOR SOLUTION INTRAMUSCULAR; INTRAVENOUS EVERY 24 HOURS
Refills: 0 | Status: DISCONTINUED | OUTPATIENT
Start: 2024-02-22 | End: 2024-02-28

## 2024-02-22 RX ORDER — CHLORHEXIDINE GLUCONATE 213 G/1000ML
1 SOLUTION TOPICAL DAILY
Refills: 0 | Status: DISCONTINUED | OUTPATIENT
Start: 2024-02-22 | End: 2024-03-07

## 2024-02-22 RX ORDER — SODIUM CHLORIDE 9 MG/ML
1000 INJECTION, SOLUTION INTRAVENOUS
Refills: 0 | Status: DISCONTINUED | OUTPATIENT
Start: 2024-02-22 | End: 2024-03-07

## 2024-02-22 RX ADMIN — Medication 30 MILLIGRAM(S): at 20:46

## 2024-02-22 RX ADMIN — SODIUM CHLORIDE 3 MILLILITER(S): 9 INJECTION INTRAMUSCULAR; INTRAVENOUS; SUBCUTANEOUS at 05:43

## 2024-02-22 RX ADMIN — SODIUM CHLORIDE 3 MILLILITER(S): 9 INJECTION INTRAMUSCULAR; INTRAVENOUS; SUBCUTANEOUS at 22:24

## 2024-02-22 RX ADMIN — Medication 1 TABLET(S): at 12:01

## 2024-02-22 RX ADMIN — HEPARIN SODIUM 5000 UNIT(S): 5000 INJECTION INTRAVENOUS; SUBCUTANEOUS at 05:36

## 2024-02-22 RX ADMIN — HEPARIN SODIUM 5000 UNIT(S): 5000 INJECTION INTRAVENOUS; SUBCUTANEOUS at 17:40

## 2024-02-22 RX ADMIN — CEFTRIAXONE 1000 MILLIGRAM(S): 500 INJECTION, POWDER, FOR SOLUTION INTRAMUSCULAR; INTRAVENOUS at 15:38

## 2024-02-22 NOTE — PROGRESS NOTE ADULT - PROBLEM SELECTOR PLAN 1
- Dating by last menstrual period  - Last EFW 1/24 1026g, 12%ile > 2/21 1173g 23%ile  - Daily prenatal vitamins  - Currently GBS unknown, GBS ordered, if delivery indicated prior to results, would start ampicllin for GBS ppx  - Received betamethasone course 2/19-2/20

## 2024-02-22 NOTE — PROGRESS NOTE ADULT - SUBJECTIVE AND OBJECTIVE BOX
YOLY JERONIMO  [facility] DELV OBWR 02    43 year old  at 32w1d by last menstrual period 23, estimated due date 24 admitted for preeclampsia with severe features now s/p 24h course of magnesium, s/p labetalol IV push 20 mg, 40 mg, on standing Procardia 30XL, s/p BMZ (-)    Subjective:  She reports good fetal movement. She denies contractions, leakage of fluid, vaginal bleeding.   She denies headaches, visual disturbances, RUQ pain, epigastric pain and new-onset edema. Denies drowsiness, somnolence and SOB.    Vital Signs Last 24 Hrs  T(C): 36.7 (2024 04:00), Max: 37 (2024 16:22)  T(F): 98.1 (2024 04:00), Max: 98.6 (2024 16:22)  HR: 84 (2024 04:00) (72 - 93)  BP: 136/75 (2024 04:00) (130/75 - 155/81)  BP(mean): --  RR: 18 (2024 04:00) (18 - 20)  SpO2: 98% (2024 04:00) (93% - 99%)    Parameters below as of 2024 04:00  Patient On (Oxygen Delivery Method): room air    Height (cm): 165.1 (24 @ 19:27)  Weight (kg): 99.8 (24 @ 19:27)  BMI (kg/m2): 36.6 (24 @ 19:27)  BSA (m2): 2.06 (24 @ 19:27)    Physical Exam:  General: Adult female in NAD  Lungs: CTAB, no wheezing, rhonchi or rales  Abdomen: soft, non-tender, gravid uterus  Pelvic: Deferred  Ext: Trace edema. No cyanosis, calf tenderness  Skin: No rashes or lesions on exposed skin  Neuro: Normal DTRs, grossly intact    Labs:                        11.4   10.03 )-----------( 388      ( 2024 05:07 )             33.9     02-22    134<L>  |  103  |  8.9  ----------------------------<  101<H>  3.8   |  19.0<L>  |  0.33<L>    Ca    8.5      2024 05:07  Mg     4.2         TPro  6.1<L>  /  Alb  3.3  /  TBili  <0.2<L>  /  DBili  x   /  AST  14  /  ALT  16  /  AlkPhos  92      MEDICATIONS  (STANDING):  heparin   Injectable 5000 Unit(s) SubCutaneous every 12 hours  magnesium sulfate Infusion 2 Gm/Hr (50 mL/Hr) IV Continuous <Continuous>  NIFEdipine XL 30 milliGRAM(s) Oral every 24 hours  prenatal multivitamin 1 Tablet(s) Oral daily  sodium chloride 0.9% lock flush 3 milliLiter(s) IV Push every 8 hours    MEDICATIONS  (PRN):  calcium carbonate    500 mG (Tums) Chewable 1 Tablet(s) Chew every 6 hours PRN Heartburn

## 2024-02-22 NOTE — PROGRESS NOTE ADULT - ASSESSMENT
43 year old  at 32w1d by last menstrual period 23, estimated due date 24 admitted for preeclampsia with severe features now s/p 24h course of magnesium, s/p labetalol IV push 20 mg, 40 mg, on standing Procardia 30XL, s/p BMZ (-)

## 2024-02-22 NOTE — CHART NOTE - NSCHARTNOTEFT_GEN_A_CORE
Patient admitted with preeclampsia with severe features now 32 weeks 1day   no complaints, BPs reviewed  NST reviewed   145 moderate variability +accels, no decels  currently maternal and fetal status reassuring   continue current plan of care

## 2024-02-22 NOTE — PROGRESS NOTE ADULT - PROBLEM SELECTOR PLAN 2
- Multiple severe range blood pressures in triage, responded to IVP labetalol 20/40mg, now on Procardia 30XL qhs. Titrate to maintain blood pressures <140/90 as able.   - Received 24h of magnesium for seizure prophylaxis  - Admit labs and repeat labs 2/20, 2/21, 2/22 unremarkable, PC ratio 0.2. Plan for repeat CBC, CMP q6d

## 2024-02-23 LAB — T PALLIDUM AB TITR SER: NEGATIVE — SIGNIFICANT CHANGE UP

## 2024-02-23 RX ADMIN — CEFTRIAXONE 1000 MILLIGRAM(S): 500 INJECTION, POWDER, FOR SOLUTION INTRAMUSCULAR; INTRAVENOUS at 14:33

## 2024-02-23 RX ADMIN — SODIUM CHLORIDE 3 MILLILITER(S): 9 INJECTION INTRAMUSCULAR; INTRAVENOUS; SUBCUTANEOUS at 05:52

## 2024-02-23 RX ADMIN — HEPARIN SODIUM 5000 UNIT(S): 5000 INJECTION INTRAVENOUS; SUBCUTANEOUS at 05:27

## 2024-02-23 RX ADMIN — Medication 30 MILLIGRAM(S): at 20:36

## 2024-02-23 RX ADMIN — HEPARIN SODIUM 5000 UNIT(S): 5000 INJECTION INTRAVENOUS; SUBCUTANEOUS at 18:03

## 2024-02-23 RX ADMIN — Medication 1 TABLET(S): at 18:01

## 2024-02-23 RX ADMIN — SODIUM CHLORIDE 3 MILLILITER(S): 9 INJECTION INTRAMUSCULAR; INTRAVENOUS; SUBCUTANEOUS at 15:01

## 2024-02-23 NOTE — PROGRESS NOTE ADULT - ASSESSMENT
43 year old  at 32w2d by last menstrual period 23, estimated due date 24 admitted for preeclampsia with severe features now s/p 24h course of magnesium, s/p labetalol IV push 20 mg, 40 mg, on standing Procardia 30XL, s/p BMZ (-)

## 2024-02-23 NOTE — PROGRESS NOTE ADULT - SUBJECTIVE AND OBJECTIVE BOX
YOLY JERONIMO  [facility] DELV OBWR 02    43 year old  at 32w2d by last menstrual period 23, estimated due date 24 admitted for preeclampsia with severe features now s/p 24h course of magnesium, s/p labetalol IV push 20 mg, 40 mg, on standing Procardia 30XL, s/p BMZ (-)    Subjective:  She reports good fetal movement. She denies contractions, leakage of fluid, vaginal bleeding.   She denies headaches, visual disturbances, RUQ pain, epigastric pain and new-onset edema. Denies drowsiness, somnolence and SOB.    Vital Signs Last 24 Hrs  T(C): 36.9 (2024 04:35), Max: 37.1 (2024 00:06)  T(F): 98.5 (2024 04:35), Max: 98.7 (2024 00:06)  HR: 90 (2024 04:35) (75 - 90)  BP: 133/79 (2024 04:35) (129/75 - 144/76)  BP(mean): --  RR: 18 (2024 04:35) (18 - 18)  SpO2: 98% (2024 04:35) (96% - 98%)    Parameters below as of 2024 04:35  Patient On (Oxygen Delivery Method): room air        Height (cm): 165.1 (24 @ 19:27)  Weight (kg): 99.8 (24 @ 19:27)  BMI (kg/m2): 36.6 (24 @ 19:27)  BSA (m2): 2.06 (24 @ 19:27)    Physical Exam:  General: Adult female in NAD  Lungs: CTAB, no wheezing, rhonchi or rales  Abdomen: soft, non-tender, gravid uterus  Pelvic: Deferred  Ext: Trace edema. No cyanosis, calf tenderness  Skin: No rashes or lesions on exposed skin  Neuro: Normal DTRs, grossly intact    Labs:                          11.4   10.03 )-----------( 388      ( 2024 05:07 )             33.9         134<L>  |  103  |  8.9  ----------------------------<  101<H>  3.8   |  19.0<L>  |  0.33<L>    Ca    8.5      2024 05:07    TPro  6.1<L>  /  Alb  3.3  /  TBili  <0.2<L>  /  DBili  x   /  AST  14  /  ALT  16  /  AlkPhos  92

## 2024-02-23 NOTE — CHART NOTE - NSCHARTNOTEFT_GEN_A_CORE
Pt admitted for management of PECwSF now 32.1w GA  BPs controlled w current regimen, continue to monitor  NST reviewed, reactive w 140 baseline, mod variability, +accels, -decels  continue with current plan of care

## 2024-02-23 NOTE — PROGRESS NOTE ADULT - PROBLEM SELECTOR PLAN 1
- Dating by last menstrual period  - Last EFW 1/24 1026g, 12%ile > 2/21 1173g 23%ile  - Daily prenatal vitamins  - GBS+ on PCR, while intrapartum start ampicllin for GBS ppx  - Received betamethasone course 2/19-2/20

## 2024-02-24 PROCEDURE — 99232 SBSQ HOSP IP/OBS MODERATE 35: CPT | Mod: GC

## 2024-02-24 RX ADMIN — HEPARIN SODIUM 5000 UNIT(S): 5000 INJECTION INTRAVENOUS; SUBCUTANEOUS at 17:13

## 2024-02-24 RX ADMIN — CEFTRIAXONE 1000 MILLIGRAM(S): 500 INJECTION, POWDER, FOR SOLUTION INTRAMUSCULAR; INTRAVENOUS at 14:43

## 2024-02-24 RX ADMIN — Medication 30 MILLIGRAM(S): at 20:30

## 2024-02-24 RX ADMIN — Medication 1 TABLET(S): at 14:43

## 2024-02-24 RX ADMIN — SODIUM CHLORIDE 3 MILLILITER(S): 9 INJECTION INTRAMUSCULAR; INTRAVENOUS; SUBCUTANEOUS at 21:00

## 2024-02-24 RX ADMIN — SODIUM CHLORIDE 3 MILLILITER(S): 9 INJECTION INTRAMUSCULAR; INTRAVENOUS; SUBCUTANEOUS at 02:17

## 2024-02-24 RX ADMIN — SODIUM CHLORIDE 3 MILLILITER(S): 9 INJECTION INTRAMUSCULAR; INTRAVENOUS; SUBCUTANEOUS at 14:45

## 2024-02-24 RX ADMIN — HEPARIN SODIUM 5000 UNIT(S): 5000 INJECTION INTRAVENOUS; SUBCUTANEOUS at 06:01

## 2024-02-24 NOTE — PROGRESS NOTE ADULT - PROBLEM SELECTOR PLAN 1
- Dating by last menstrual period  - Last EFW 1/24 1026g, 12%ile > 2/21 1173g 23%ile  - Daily prenatal vitamins  - GBS+ on PCR, while intrapartum start ampicllin for GBS ppx  - Received betamethasone course 2/19-2/20 - Dating by last menstrual period  - Last EFW 1/24 1026g, 12%ile > 2/21 1173g 23%ile  - Daily prenatal vitamins  - GBS+ on PCR, while intrapartum start ampicillin for GBS ppx  - Received betamethasone course 2/19-2/20

## 2024-02-24 NOTE — PROGRESS NOTE ADULT - PROBLEM SELECTOR PLAN 4
- Daily prenatal vitamins  - Keep type and screen active, next 2/25  - DVT ppx: encourage ambulation, SCDs when in bed, heparin subq to start on HD#3  - NST BID

## 2024-02-24 NOTE — PROGRESS NOTE ADULT - PROBLEM SELECTOR PLAN 2
- Multiple severe range blood pressures in triage, responded to IVP labetalol 20/40mg, now on Procardia 30XL qhs. Titrate to maintain blood pressures <140/90 as able.   - Received 24h of magnesium for seizure prophylaxis  - Admit labs and repeat labs 2/20, 2/21, 2/22 unremarkable, PC ratio 0.2. Plan for repeat CBC, CMP q6d  -had severe range BPs overnight 2/22 over >1hr apart, not requiring pushes. - Multiple severe range blood pressures in triage, responded to IVP labetalol 20/40mg, now on Procardia 30XL qhs. Titrate to maintain blood pressures <140/90 as able.   - Received 24h of magnesium for seizure prophylaxis  - Admit labs and repeat labs 2/20, 2/21, 2/22 unremarkable, PC ratio 0.2. Plan for repeat CBC, CMP q6d  -had severe range BPs overnight 2/22 over >1hr apart, not requiring pushes.  -elevated BPs in last 24hrs without severe ranges

## 2024-02-24 NOTE — PROGRESS NOTE ADULT - SUBJECTIVE AND OBJECTIVE BOX
YOLY JERONIMO  [facility] DELV OBWR 02    43 year old  at 32w2d by last menstrual period 23, estimated due date 24 admitted for preeclampsia with severe features now s/p 24h course of magnesium, s/p labetalol IV push 20 mg, 40 mg, on standing Procardia 30XL, s/p BMZ (-)    Subjective:  She reports good fetal movement. She denies contractions, leakage of fluid, vaginal bleeding.   She denies headaches, visual disturbances, RUQ pain, epigastric pain and new-onset edema. Denies drowsiness, somnolence and SOB.    Vital Signs Last 24 Hrs  T(C): 36.7 (2024 04:37), Max: 36.9 (2024 08:00)  T(F): 98.1 (2024 04:37), Max: 98.4 (2024 08:00)  HR: 84 (2024 04:37) (76 - 87)  BP: 113/68 (2024 04:37) (113/68 - 135/80)  RR: 18 (2024 04:37) (18 - 18)  SpO2: 97% (2024 04:37) (95% - 98%)    Parameters below as of 2024 04:37  Patient On (Oxygen Delivery Method): room air          Height (cm): 165.1 (24 @ 19:27)  Weight (kg): 99.8 (24 @ 19:27)  BMI (kg/m2): 36.6 (24 @ 19:27)  BSA (m2): 2.06 (24 @ 19:27)    Physical Exam:  General: Adult female in NAD  Lungs: CTAB, no wheezing, rhonchi or rales  Abdomen: soft, non-tender, gravid uterus  Pelvic: Deferred  Ext: Trace edema. No cyanosis, calf tenderness  Skin: No rashes or lesions on exposed skin  Neuro: Normal DTRs, grossly intact    Labs:                            11.4   10.03 )-----------( 388      ( 2024 05:07 )             33.9         134<L>  |  103  |  8.9  ----------------------------<  101<H>  3.8   |  19.0<L>  |  0.33<L>    Ca    8.5      2024 05:07    TPro  6.1<L>  /  Alb  3.3  /  TBili  <0.2<L>  /  DBili  x   /  AST  14  /  ALT  16  /  AlkPhos  92        YOLY JERONIMO  [facility] DELV OBWR 02    43 year old  at 32w4d by last menstrual period 23, estimated due date 24 admitted for preeclampsia with severe features now s/p 24h course of magnesium, s/p labetalol IV push 20 mg, 40 mg, on standing Procardia 30XL, s/p BMZ (-)    Subjective:  She reports good fetal movement. She denies contractions, leakage of fluid, vaginal bleeding.   She denies headaches, visual disturbances, RUQ pain, epigastric pain and new-onset edema. Denies drowsiness, somnolence and SOB.    Vital Signs Last 24 Hrs  T(C): 36.7 (2024 04:37), Max: 36.9 (2024 08:00)  T(F): 98.1 (2024 04:37), Max: 98.4 (2024 08:00)  HR: 84 (2024 04:37) (76 - 87)  BP: 113/68 (2024 04:37) (113/68 - 135/80)  RR: 18 (2024 04:37) (18 - 18)  SpO2: 97% (2024 04:37) (95% - 98%)    Parameters below as of 2024 04:37  Patient On (Oxygen Delivery Method): room air          Height (cm): 165.1 (24 @ 19:27)  Weight (kg): 99.8 (24 @ 19:27)  BMI (kg/m2): 36.6 (24 @ 19:27)  BSA (m2): 2.06 (24 @ 19:27)    Physical Exam:  General: Adult female in NAD  Lungs: CTAB, no wheezing, rhonchi or rales  Abdomen: soft, non-tender, gravid uterus  Pelvic: Deferred  Ext: Trace edema. No cyanosis, calf tenderness  Skin: No rashes or lesions on exposed skin  Neuro: Normal DTRs, grossly intact    Labs:                            11.4   10.03 )-----------( 388      ( 2024 05:07 )             33.9         134<L>  |  103  |  8.9  ----------------------------<  101<H>  3.8   |  19.0<L>  |  0.33<L>    Ca    8.5      2024 05:07    TPro  6.1<L>  /  Alb  3.3  /  TBili  <0.2<L>  /  DBili  x   /  AST  14  /  ALT  16  /  AlkPhos  92

## 2024-02-24 NOTE — PROGRESS NOTE ADULT - ASSESSMENT
43 year old  at 32w2d by last menstrual period 23, estimated due date 24 admitted for preeclampsia with severe features now s/p 24h course of magnesium, s/p labetalol IV push 20 mg, 40 mg, on standing Procardia 30XL, s/p BMZ (-) 43 year old  at 32w4d by last menstrual period 23, estimated due date 24 admitted for preeclampsia with severe features now s/p 24h course of magnesium, s/p labetalol IV push 20 mg, 40 mg, on standing Procardia 30XL, s/p BMZ (-)

## 2024-02-24 NOTE — PROGRESS NOTE ADULT - NSPROGADDITIONALINFOA_GEN_ALL_CORE
MFM Fellow Addendum    43 year old  at 32w4d by last menstrual period 23, estimated due date 24 admitted for preeclampsia with severe features now s/p 24h course of magnesium, s/p labetalol IV push 20 mg, 40 mg, on standing Procardia 30XL, s/p BMZ (-)  Preeclampsia with SF , sp IVP labetalol at initial admission. Currently on Procardia 30XL qhs. Titrate to maintain blood pressures <140/90 as able. S/p 24h magnesium for seizure ppx. Labs remain stable. Plan for delivery at 34 weeks or earlier if clinically indicated. Will schedule delivery tomorrow.   - Fetus BID NST, Last EFW  1026g, 12%ile >  1173g 23%ile. S/p Betamethasone for fetal lung maturity, rescue steroids if remains pregnant 2 weeks s/p initial dose.   - GBS+ on PCR, while intrapartum start ampicillin for GBS ppx  - Received betamethasone course -  - On heparin for dvt ppx    Discussed with Dr Katz M Attending.   Sebas Starks DO  MFM Fellow

## 2024-02-25 LAB — BLD GP AB SCN SERPL QL: SIGNIFICANT CHANGE UP

## 2024-02-25 RX ADMIN — HEPARIN SODIUM 5000 UNIT(S): 5000 INJECTION INTRAVENOUS; SUBCUTANEOUS at 05:43

## 2024-02-25 RX ADMIN — Medication 30 MILLIGRAM(S): at 20:58

## 2024-02-25 RX ADMIN — SODIUM CHLORIDE 3 MILLILITER(S): 9 INJECTION INTRAMUSCULAR; INTRAVENOUS; SUBCUTANEOUS at 14:00

## 2024-02-25 RX ADMIN — SODIUM CHLORIDE 3 MILLILITER(S): 9 INJECTION INTRAMUSCULAR; INTRAVENOUS; SUBCUTANEOUS at 21:32

## 2024-02-25 RX ADMIN — HEPARIN SODIUM 5000 UNIT(S): 5000 INJECTION INTRAVENOUS; SUBCUTANEOUS at 18:11

## 2024-02-25 RX ADMIN — SODIUM CHLORIDE 3 MILLILITER(S): 9 INJECTION INTRAMUSCULAR; INTRAVENOUS; SUBCUTANEOUS at 06:19

## 2024-02-25 RX ADMIN — Medication 1 TABLET(S): at 14:41

## 2024-02-25 RX ADMIN — CEFTRIAXONE 1000 MILLIGRAM(S): 500 INJECTION, POWDER, FOR SOLUTION INTRAMUSCULAR; INTRAVENOUS at 14:41

## 2024-02-26 PROCEDURE — 99232 SBSQ HOSP IP/OBS MODERATE 35: CPT | Mod: GC

## 2024-02-26 RX ADMIN — HEPARIN SODIUM 5000 UNIT(S): 5000 INJECTION INTRAVENOUS; SUBCUTANEOUS at 17:48

## 2024-02-26 RX ADMIN — SODIUM CHLORIDE 3 MILLILITER(S): 9 INJECTION INTRAMUSCULAR; INTRAVENOUS; SUBCUTANEOUS at 05:33

## 2024-02-26 RX ADMIN — SODIUM CHLORIDE 3 MILLILITER(S): 9 INJECTION INTRAMUSCULAR; INTRAVENOUS; SUBCUTANEOUS at 22:04

## 2024-02-26 RX ADMIN — SODIUM CHLORIDE 3 MILLILITER(S): 9 INJECTION INTRAMUSCULAR; INTRAVENOUS; SUBCUTANEOUS at 18:10

## 2024-02-26 RX ADMIN — Medication 1 TABLET(S): at 12:15

## 2024-02-26 RX ADMIN — HEPARIN SODIUM 5000 UNIT(S): 5000 INJECTION INTRAVENOUS; SUBCUTANEOUS at 05:12

## 2024-02-26 RX ADMIN — CEFTRIAXONE 1000 MILLIGRAM(S): 500 INJECTION, POWDER, FOR SOLUTION INTRAMUSCULAR; INTRAVENOUS at 15:38

## 2024-02-26 RX ADMIN — Medication 30 MILLIGRAM(S): at 21:10

## 2024-02-26 NOTE — PROGRESS NOTE ADULT - PROBLEM SELECTOR PLAN 2
- Multiple severe range blood pressures in triage, responded to IVP labetalol 20/40mg, now on Procardia 30XL qhs. Had severe range BPs overnight 2/22 over >1hr apart, not requiring pushes. No severe ranges in the past 24h. Titrate to maintain blood pressures <140/90 as able. Last 24h BPs (131/83 - 137/86)  - Received 24h of magnesium for seizure prophylaxis  - Admit labs and repeat labs 2/20, 2/21, 2/22 unremarkable, PC ratio 0.2. Plan for repeat CBC, CMP q6d

## 2024-02-26 NOTE — CHART NOTE - NSCHARTNOTEFT_GEN_A_CORE
NST initially with no accels, given ice and juice. NST improved -> baseline 135 bpm, moderate variability, +accels, -decels. Approved by Dr. Ku to d/c NST. Bedside BPP 8/8, cephalic, anterior placenta, STEVEN 18.47, gross fetal movement, tone and breathing seen - in local archive.

## 2024-02-26 NOTE — PROGRESS NOTE ADULT - PROBLEM SELECTOR PLAN 1
- Dating by last menstrual period  - Last EFW 1/24 1026g, 12%ile > 2/21 1173g 23%ile  - Daily prenatal vitamins  - GBS+ on PCR, while intrapartum start ampicillin for GBS ppx  - Received betamethasone course 2/19-2/20

## 2024-02-26 NOTE — PROGRESS NOTE ADULT - ASSESSMENT
43 year old  at 32w5d by last menstrual period 23, estimated due date 24 admitted for preeclampsia with severe features now s/p 24h course of magnesium, s/p labetalol IV push 20 mg, 40 mg, on standing Procardia 30XL, s/p BMZ (-)

## 2024-02-26 NOTE — CHART NOTE - NSCHARTNOTEFT_GEN_A_CORE
Nutrition Note: Spoke with pt. Reports eating well throughout admission <% of meals and good appetite. Discussed healthy nutrition during pregnancy and foods that contain high amount of sodium; verbal nutrition education provided. RD to remain available.

## 2024-02-26 NOTE — PROGRESS NOTE ADULT - SUBJECTIVE AND OBJECTIVE BOX
YOLY JERONIMO  [facility] DELV OBWR 02    43 year old  at 32w5d by last menstrual period 23, estimated due date 24 admitted for preeclampsia with severe features now s/p 24h course of magnesium, s/p labetalol IV push 20 mg, 40 mg, on standing Procardia 30XL, s/p BMZ (-)    Subjective:  She reports good fetal movement. She denies contractions, leakage of fluid, vaginal bleeding.   She denies headaches, visual disturbances, RUQ pain, epigastric pain and new-onset edema. Denies drowsiness, somnolence and SOB.    Vital Signs Last 24 Hrs  T(C): 36.6 (2024 03:44), Max: 37.2 (2024 16:48)  T(F): 97.8 (2024 03:44), Max: 98.9 (2024 16:48)  HR: 86 (2024 03:44) (77 - 94)  BP: 135/84 (2024 03:44) (131/83 - 137/86)  BP(mean): --  RR: 18 (2024 03:44) (18 - 18)  SpO2: 98% (2024 03:44) (95% - 99%)    Parameters below as of 2024 03:44  Patient On (Oxygen Delivery Method): room air        Height (cm): 165.1 (24 @ 19:27)  Weight (kg): 99.8 (24 @ 19:27)  BMI (kg/m2): 36.6 (24 @ 19:27)  BSA (m2): 2.06 (24 @ 19:27)    Physical Exam:  General: Adult female in NAD  Lungs: CTAB, no wheezing, rhonchi or rales  Abdomen: soft, non-tender, gravid uterus  Pelvic: Deferred  Ext: No edema. No cyanosis, calf tenderness  Skin: No rashes or lesions on exposed skin  Neuro: Normal DTRs, grossly intact    Labs:                            11.4   10.03 )-----------( 388      ( 2024 05:07 )             33.9         134<L>  |  103  |  8.9  ----------------------------<  101<H>  3.8   |  19.0<L>  |  0.33<L>    Ca    8.5      2024 05:07    TPro  6.1<L>  /  Alb  3.3  /  TBili  <0.2<L>  /  DBili  x   /  AST  14  /  ALT  16  /  AlkPhos  92        YOLY JERONIMO  [facility] DELV OBWR 02    43 year old  at 32w5d by last menstrual period 23, estimated due date 24 admitted for preeclampsia with severe features now s/p 24h course of magnesium, s/p labetalol IV push 20 mg, 40 mg, on standing Procardia 30XL, s/p BMZ (-)    Subjective:  She reports feeling fetal movement, though somewhat decreased from baseline since yesterday. She denies contractions, leakage of fluid, vaginal bleeding.   She denies headaches, visual disturbances, RUQ pain, epigastric pain and new-onset edema. Denies drowsiness, somnolence and SOB.    Vital Signs Last 24 Hrs  T(C): 36.6 (2024 03:44), Max: 37.2 (2024 16:48)  T(F): 97.8 (2024 03:44), Max: 98.9 (2024 16:48)  HR: 86 (2024 03:44) (77 - 94)  BP: 135/84 (2024 03:44) (131/83 - 137/86)  BP(mean): --  RR: 18 (2024 03:44) (18 - 18)  SpO2: 98% (2024 03:44) (95% - 99%)    Parameters below as of 2024 03:44  Patient On (Oxygen Delivery Method): room air        Height (cm): 165.1 (24 @ 19:27)  Weight (kg): 99.8 (24 @ 19:27)  BMI (kg/m2): 36.6 (24 @ 19:27)  BSA (m2): 2.06 (24 @ 19:27)    NST overnight: 145, moderate variability, +accels, -decels  [place]: None    Physical Exam:  General: Adult female in NAD  Lungs: CTAB, no wheezing, rhonchi or rales  Abdomen: soft, non-tender, gravid uterus  Pelvic: Deferred  Ext: No edema. No cyanosis, calf tenderness  Skin: No rashes or lesions on exposed skin  Neuro: Normal DTRs, grossly intact    Labs:                            11.4   10.03 )-----------( 388      ( 2024 05:07 )             33.9     02-    134<L>  |  103  |  8.9  ----------------------------<  101<H>  3.8   |  19.0<L>  |  0.33<L>    Ca    8.5      2024 05:07    TPro  6.1<L>  /  Alb  3.3  /  TBili  <0.2<L>  /  DBili  x   /  AST  14  /  ALT  16  /  AlkPhos  92

## 2024-02-27 PROCEDURE — 99232 SBSQ HOSP IP/OBS MODERATE 35: CPT | Mod: GC

## 2024-02-27 RX ADMIN — Medication 30 MILLIGRAM(S): at 20:23

## 2024-02-27 RX ADMIN — HEPARIN SODIUM 5000 UNIT(S): 5000 INJECTION INTRAVENOUS; SUBCUTANEOUS at 05:26

## 2024-02-27 RX ADMIN — HEPARIN SODIUM 5000 UNIT(S): 5000 INJECTION INTRAVENOUS; SUBCUTANEOUS at 18:03

## 2024-02-27 RX ADMIN — CEFTRIAXONE 1000 MILLIGRAM(S): 500 INJECTION, POWDER, FOR SOLUTION INTRAMUSCULAR; INTRAVENOUS at 15:19

## 2024-02-27 RX ADMIN — Medication 1 TABLET(S): at 12:25

## 2024-02-27 RX ADMIN — SODIUM CHLORIDE 3 MILLILITER(S): 9 INJECTION INTRAMUSCULAR; INTRAVENOUS; SUBCUTANEOUS at 06:13

## 2024-02-27 NOTE — PROGRESS NOTE ADULT - ASSESSMENT
43 year old  at 32w6d by last menstrual period 23, estimated due date 24 admitted for preeclampsia with severe features now s/p 24h course of magnesium, s/p labetalol IV push 20 mg, 40 mg, on standing Procardia 30XL, s/p BMZ (-)

## 2024-02-27 NOTE — PROGRESS NOTE ADULT - SUBJECTIVE AND OBJECTIVE BOX
YOLY JERONIMO  [facility] DELV OBWR 02    43 year old  at 32w6d by last menstrual period 23, estimated due date 24 admitted for preeclampsia with severe features now s/p 24h course of magnesium, s/p labetalol IV push 20 mg, 40 mg, on standing Procardia 30XL, s/p BMZ (-)    Subjective:  She reports feeling fetal movement, increased since 2 days ago but still not as much as one week ago. She denies contractions, leakage of fluid, vaginal bleeding.   She denies headaches, visual disturbances, RUQ pain, epigastric pain and new-onset edema. Denies drowsiness, somnolence and SOB.    Vital Signs Last 24 Hrs  T(C): 36.6 (2024 05:00), Max: 36.7 (2024 08:00)  T(F): 97.8 (2024 05:00), Max: 98 (2024 08:00)  HR: 82 (2024 05:00) (82 - 100)  BP: 136/85 (2024 05:00) (127/79 - 142/88)  BP(mean): --  RR: 18 (2024 05:00) (18 - 18)  SpO2: 94% (2024 05:00) (94% - 98%)    Parameters below as of 2024 19:52  Patient On (Oxygen Delivery Method): room air    Height (cm): 165.1 (24 @ 19:27)  Weight (kg): 99.8 (24 @ 19:27)  BMI (kg/m2): 36.6 (24 @ 19:27)  BSA (m2): 2.06 (24 @ 19:27)    NST overnight: 135, moderate variability, +accels, -decels  [place]: None    Physical Exam:  General: Adult female in NAD  Lungs: CTAB, no wheezing, rhonchi or rales  Abdomen: soft, non-tender, gravid uterus  Pelvic: Deferred  Ext: Trace edema. No cyanosis, calf tenderness  Skin: No rashes or lesions on exposed skin  Neuro: Normal DTRs, grossly intact    Labs:                            11.4   10.03 )-----------( 388      ( 2024 05:07 )             33.9     -    134<L>  |  103  |  8.9  ----------------------------<  101<H>  3.8   |  19.0<L>  |  0.33<L>    Ca    8.5      2024 05:07    TPro  6.1<L>  /  Alb  3.3  /  TBili  <0.2<L>  /  DBili  x   /  AST  14  /  ALT  16  /  AlkPhos  92        YOLY JERONIMO  [facility] DELV OBWR 02    43 year old  at 32w6d by last menstrual period 23, estimated due date 24 admitted for preeclampsia with severe features now s/p 24h course of magnesium, s/p labetalol IV push 20 mg, 40 mg, on standing Procardia 30XL, s/p BMZ (-)    Subjective:  She reports feeling fetal movement, increased since 2 days ago but still not as much as one week ago. She denies contractions, leakage of fluid, vaginal bleeding.   She denies headaches, visual disturbances, RUQ pain, epigastric pain and new-onset edema. Denies drowsiness, somnolence and SOB.    Vital Signs Last 24 Hrs  T(C): 36.6 (2024 05:00), Max: 36.7 (2024 08:00)  T(F): 97.8 (2024 05:00), Max: 98 (2024 08:00)  HR: 82 (2024 05:00) (82 - 100)  BP: 136/85 (2024 05:00) (127/79 - 142/88)  BP(mean): --  RR: 18 (2024 05:00) (18 - 18)  SpO2: 94% (2024 05:00) (94% - 98%)    Parameters below as of 2024 19:52  Patient On (Oxygen Delivery Method): room air    Height (cm): 165.1 (24 @ 19:27)  Weight (kg): 99.8 (24 @ 19:27)  BMI (kg/m2): 36.6 (24 @ 19:27)  BSA (m2): 2.06 (24 @ 19:27)    NST overnight: 135, moderate variability, +accels, -decels  [place]: None    Physical Exam:  General: Adult female in NAD  Lungs: CTAB, no wheezing, rhonchi or rales  Abdomen: soft, non-tender, gravid uterus  Pelvic: Deferred  Ext: Trace edema. No cyanosis, calf tenderness  Skin: No rashes or lesions on exposed skin  Neuro: Normal DTRs, grossly intact    Labs:                       11.4   10.03 )-----------( 388      ( 2024 05:07 )             33.9     -    134<L>  |  103  |  8.9  ----------------------------<  101<H>  3.8   |  19.0<L>  |  0.33<L>    Ca    8.5      2024 05:07    TPro  6.1<L>  /  Alb  3.3  /  TBili  <0.2<L>  /  DBili  x   /  AST  14  /  ALT  16  /  AlkPhos  92

## 2024-02-27 NOTE — PROGRESS NOTE ADULT - PROBLEM SELECTOR PLAN 1
- Dating by last menstrual period  - Last EFW 1/24 1026g, 12%ile > 2/21 1173g 23%ile  - Daily prenatal vitamins  - GBS+ on PCR, while intrapartum start ampicillin for GBS ppx  - Received betamethasone course 2/19-2/20 - Dating by last menstrual period  - Last EFW on 2/21/24 was 1173g (23%ile)  - Continue daily prenatal vitamins  - GBS+ on PCR, recommend giving intrapartum ampicillin for GBS ppx  - Received betamethasone course 2/19-2/20

## 2024-02-27 NOTE — PROGRESS NOTE ADULT - PROBLEM SELECTOR PLAN 2
- Multiple severe range blood pressures in triage, responded to IVP labetalol 20/40mg, now on Procardia 30XL qhs. Had severe range BPs overnight 2/22 over >1hr apart, not requiring pushes. No severe ranges in the past 24h. Titrate to maintain blood pressures <140/90 as able. Last 24h BPs (131/83 - 137/86)  - Received 24h of magnesium for seizure prophylaxis  - Admit labs and repeat labs 2/20, 2/21, 2/22 unremarkable, PC ratio 0.2. Plan for repeat CBC, CMP q6d - next set 2/28

## 2024-02-27 NOTE — PROGRESS NOTE ADULT - PROBLEM SELECTOR PLAN 4
- Daily prenatal vitamins  - Keep type and screen active, next 2/28  - DVT ppx: encourage ambulation, SCDs when in bed, heparin subq to start on HD#3  - NST BID

## 2024-02-28 DIAGNOSIS — Z3A.33 33 WEEKS GESTATION OF PREGNANCY: ICD-10-CM

## 2024-02-28 LAB
ALBUMIN SERPL ELPH-MCNC: 3.3 G/DL — SIGNIFICANT CHANGE UP (ref 3.3–5.2)
ALP SERPL-CCNC: 107 U/L — SIGNIFICANT CHANGE UP (ref 40–120)
ALT FLD-CCNC: 17 U/L — SIGNIFICANT CHANGE UP
ANION GAP SERPL CALC-SCNC: 14 MMOL/L — SIGNIFICANT CHANGE UP (ref 5–17)
AST SERPL-CCNC: 20 U/L — SIGNIFICANT CHANGE UP
BASOPHILS # BLD AUTO: 0.05 K/UL — SIGNIFICANT CHANGE UP (ref 0–0.2)
BASOPHILS NFR BLD AUTO: 0.7 % — SIGNIFICANT CHANGE UP (ref 0–2)
BILIRUB SERPL-MCNC: <0.2 MG/DL — LOW (ref 0.4–2)
BLD GP AB SCN SERPL QL: SIGNIFICANT CHANGE UP
BUN SERPL-MCNC: 10 MG/DL — SIGNIFICANT CHANGE UP (ref 8–20)
CALCIUM SERPL-MCNC: 8.9 MG/DL — SIGNIFICANT CHANGE UP (ref 8.4–10.5)
CHLORIDE SERPL-SCNC: 102 MMOL/L — SIGNIFICANT CHANGE UP (ref 96–108)
CO2 SERPL-SCNC: 19 MMOL/L — LOW (ref 22–29)
CREAT SERPL-MCNC: 0.31 MG/DL — LOW (ref 0.5–1.3)
EGFR: 136 ML/MIN/1.73M2 — SIGNIFICANT CHANGE UP
EOSINOPHIL # BLD AUTO: 0.18 K/UL — SIGNIFICANT CHANGE UP (ref 0–0.5)
EOSINOPHIL NFR BLD AUTO: 2.4 % — SIGNIFICANT CHANGE UP (ref 0–6)
GLUCOSE SERPL-MCNC: 98 MG/DL — SIGNIFICANT CHANGE UP (ref 70–99)
HCT VFR BLD CALC: 37.3 % — SIGNIFICANT CHANGE UP (ref 34.5–45)
HGB BLD-MCNC: 12.3 G/DL — SIGNIFICANT CHANGE UP (ref 11.5–15.5)
IMM GRANULOCYTES NFR BLD AUTO: 0.8 % — SIGNIFICANT CHANGE UP (ref 0–0.9)
LYMPHOCYTES # BLD AUTO: 2.22 K/UL — SIGNIFICANT CHANGE UP (ref 1–3.3)
LYMPHOCYTES # BLD AUTO: 30 % — SIGNIFICANT CHANGE UP (ref 13–44)
MCHC RBC-ENTMCNC: 28.3 PG — SIGNIFICANT CHANGE UP (ref 27–34)
MCHC RBC-ENTMCNC: 33 GM/DL — SIGNIFICANT CHANGE UP (ref 32–36)
MCV RBC AUTO: 85.7 FL — SIGNIFICANT CHANGE UP (ref 80–100)
MONOCYTES # BLD AUTO: 0.49 K/UL — SIGNIFICANT CHANGE UP (ref 0–0.9)
MONOCYTES NFR BLD AUTO: 6.6 % — SIGNIFICANT CHANGE UP (ref 2–14)
NEUTROPHILS # BLD AUTO: 4.41 K/UL — SIGNIFICANT CHANGE UP (ref 1.8–7.4)
NEUTROPHILS NFR BLD AUTO: 59.5 % — SIGNIFICANT CHANGE UP (ref 43–77)
PLATELET # BLD AUTO: 355 K/UL — SIGNIFICANT CHANGE UP (ref 150–400)
POTASSIUM SERPL-MCNC: 4 MMOL/L — SIGNIFICANT CHANGE UP (ref 3.5–5.3)
POTASSIUM SERPL-SCNC: 4 MMOL/L — SIGNIFICANT CHANGE UP (ref 3.5–5.3)
PROT SERPL-MCNC: 6.1 G/DL — LOW (ref 6.6–8.7)
RBC # BLD: 4.35 M/UL — SIGNIFICANT CHANGE UP (ref 3.8–5.2)
RBC # FLD: 14.2 % — SIGNIFICANT CHANGE UP (ref 10.3–14.5)
SODIUM SERPL-SCNC: 135 MMOL/L — SIGNIFICANT CHANGE UP (ref 135–145)
WBC # BLD: 7.41 K/UL — SIGNIFICANT CHANGE UP (ref 3.8–10.5)
WBC # FLD AUTO: 7.41 K/UL — SIGNIFICANT CHANGE UP (ref 3.8–10.5)

## 2024-02-28 PROCEDURE — 99232 SBSQ HOSP IP/OBS MODERATE 35: CPT

## 2024-02-28 RX ADMIN — Medication 30 MILLIGRAM(S): at 20:23

## 2024-02-28 RX ADMIN — HEPARIN SODIUM 5000 UNIT(S): 5000 INJECTION INTRAVENOUS; SUBCUTANEOUS at 17:34

## 2024-02-28 RX ADMIN — HEPARIN SODIUM 5000 UNIT(S): 5000 INJECTION INTRAVENOUS; SUBCUTANEOUS at 05:15

## 2024-02-28 RX ADMIN — Medication 1 TABLET(S): at 12:14

## 2024-02-28 NOTE — PROGRESS NOTE ADULT - PROBLEM SELECTOR PLAN 5
- Daily prenatal vitamins  - Keep type and screen active, next 2/28  - DVT ppx: encourage ambulation, SCDs when in bed, receiving subq heparin  - NST BID Obesity in pregnancy increases the risk of preeclampsia, as well as complications with delivery including arrest of dilation/descent, and postpartum hemorrhage.

## 2024-02-28 NOTE — PROGRESS NOTE ADULT - PROBLEM SELECTOR PLAN 3
- Multiple severe range blood pressures in triage, responded to IVP labetalol 20/40mg, now on Procardia 30XL qhs. Had severe range BPs overnight 2/22 over >1hr apart, not requiring pushes. No severe ranges in the past 24h. Titrate to maintain blood pressures <140/90 as able. Last 24h BPs  (122/84 - 147/93)  - Received 24h of magnesium for seizure prophylaxis  - Admit labs and repeat labs 2/20, 2/21, 2/22 unremarkable, PC ratio 0.2. Repeat CBC this AM wnl, pending repeat CMP. AMA is a risk factor for adverse maternal, fetal, and  outcomes. With delivery at age 40 years or older, there are increased rates of chromosomal abnormalities and aneuploidy, congenital anomalies, LGA and SGA neonates,  morbidity and stillbirth, GDM, PEC, labor dystocia, and  delivery.   - NIPS was low risk  - She was prescribed ASA ~25w GA, but she has only been taking it 3x/wk. Aspirin no longer indicated in light of preeclampsia diagnosis

## 2024-02-28 NOTE — PROGRESS NOTE ADULT - PROBLEM SELECTOR PLAN 2
Nandini Gonzalez CMA   11/24/2021  8:58 AM CST Back to Top        Referral placed     Nandini Gonzalez CMA on 11/24/2021 at 8:58 AM    Ree Pate PA-C   11/22/2021  8:55 AM CST         Basal cell carcinoma on the left preauricular cheek area.  Please refer to dermatology surgery for Mohs procedure.     Thanks, Ree Gutierrez,  The biopsy on the left preauricular cheek area is a basal cell carcinoma, extending to the deep margin. This is a localized skin cancer and can continue growing if not further removed. It does not spread to other areas on the body. We will refer you to derm/surgery to have a Moh's procedure, a skin sparing surgery.       Let me know if you have any questions or concerns        Best, Ree Pate PA-C   Written by Ree Pate PA-C on 11/22/2021  8:55 AM CST    Nandini Gonzalez CMA on 11/24/2021 at 9:00 AM   - Dating by last menstrual period  - Last EFW on 2/21/24 was 1173g (23%ile)  - Continue daily prenatal vitamins  - GBS+ by PCR swab - plan to start intrapartum ampicillin for GBS ppx during IOL 3/6  - Received betamethasone course 2/19-2/20 - Multiple severe range blood pressures in triage, responded to IVP labetalol 20/40mg, now on Procardia 30XL qhs. Had severe range BPs overnight 2/22 over >1hr apart, not requiring pushes. No severe ranges in the past 24h. Titrate to maintain blood pressures <140/90 as able. Last 24h BPs  (122/84 - 147/93)  - Received 24h of magnesium for seizure prophylaxis  - Admit labs and repeat labs 2/20, 2/21, 2/22 unremarkable, PC ratio 0.2. Repeat CBC this AM wnl, pending repeat CMP.

## 2024-02-28 NOTE — PROGRESS NOTE ADULT - SUBJECTIVE AND OBJECTIVE BOX
YOLY JERONIMO  [facility] DELV OBWR 02    43 year old  at 33w0d by last menstrual period 23, estimated due date 24 admitted for preeclampsia with severe features now s/p 24h course of magnesium, s/p labetalol IV push 20 mg, 40 mg, on standing Procardia 30XL, s/p BMZ (-)    Subjective:  She reports feeling fetal movement. She denies contractions, leakage of fluid, vaginal bleeding.   She denies headaches, visual disturbances, RUQ pain, epigastric pain and new-onset edema. Denies drowsiness, somnolence and SOB.    Vital Signs Last 24 Hrs  T(C): 37.1 (2024 05:13), Max: 37.1 (2024 05:13)  T(F): 98.7 (2024 05:13), Max: 98.7 (2024 05:13)  HR: 87 (2024 05:13) (73 - 93)  BP: 134/83 (2024 05:13) (122/84 - 147/93)  BP(mean): --  RR: 18 (2024 05:13) (18 - 18)  SpO2: 95% (2024 05:13) (90% - 98%)    Parameters below as of 2024 05:13  Patient On (Oxygen Delivery Method): room air    Height (cm): 165.1 (24 @ 19:27)  Weight (kg): 99.8 (24 @ 19:27)  BMI (kg/m2): 36.6 (24 @ 19:27)  BSA (m2): 2.06 (24 @ 19:27)    NST overnight: 135, moderate variability, +accels, -decels  [place]: None    Physical Exam:  General: Adult female in NAD  Lungs: CTAB, no wheezing, rhonchi or rales  Abdomen: soft, non-tender, gravid uterus  Pelvic: Deferred  Ext: Trace edema. No cyanosis, calf tenderness  Skin: No rashes or lesions on exposed skin  Neuro: Normal DTRs, grossly intact    Labs:                                12.3   7.41  )-----------( 355      ( 2024 06:08 )             37.3     CMP pending    MEDICATIONS  (STANDING):  chlorhexidine 2% Cloths 1 Application(s) Topical daily  citric acid/sodium citrate Solution 30 milliLiter(s) Oral once  heparin   Injectable 5000 Unit(s) SubCutaneous every 12 hours  lactated ringers. 1000 milliLiter(s) (125 mL/Hr) IV Continuous <Continuous>  magnesium sulfate Infusion 2 Gm/Hr (50 mL/Hr) IV Continuous <Continuous>  NIFEdipine XL 30 milliGRAM(s) Oral every 24 hours  oxytocin Infusion 333.333 milliUNIT(s)/Min (1000 mL/Hr) IV Continuous <Continuous>  oxytocin Infusion.  milliUNIT(s)/Min (2 mL/Hr) IV Continuous <Continuous>  prenatal multivitamin 1 Tablet(s) Oral daily  sodium chloride 0.9% lock flush 3 milliLiter(s) IV Push every 8 hours    MEDICATIONS  (PRN):  calcium carbonate    500 mG (Tums) Chewable 1 Tablet(s) Chew every 6 hours PRN Heartburn

## 2024-02-28 NOTE — PROGRESS NOTE ADULT - PROBLEM SELECTOR PROBLEM 3
Hypertension in pregnancy, preeclampsia, severe, antepartum, third trimester Advanced maternal age in multigravida

## 2024-02-28 NOTE — PROGRESS NOTE ADULT - PROBLEM SELECTOR PROBLEM 2
33 weeks gestation of pregnancy Hypertension in pregnancy, preeclampsia, severe, antepartum, third trimester

## 2024-02-28 NOTE — PROGRESS NOTE ADULT - PROBLEM SELECTOR PLAN 4
AMA is a risk factor for adverse maternal, fetal, and  outcomes. With delivery at age 40 years or older, there are increased rates of chromosomal abnormalities and aneuploidy, congenital anomalies, LGA and SGA neonates,  morbidity and stillbirth, GDM, PEC, labor dystocia, and  delivery.   - NIPS was low risk  - She was prescribed ASA ~25w GA, but she has only been taking it 3x/wk. Aspirin no longer indicated in light of preeclampsia diagnosis - Daily prenatal vitamins  - Keep type and screen active, next 2/28  - DVT ppx: encourage ambulation, SCDs when in bed, receiving subq heparin  - NST BID

## 2024-02-28 NOTE — PROGRESS NOTE ADULT - PROBLEM SELECTOR PLAN 1
- Dating by last menstrual period  - Last EFW on 2/21/24 was 1173g (23%ile)  - Continue daily prenatal vitamins  - GBS+ by PCR swab - plan to start intrapartum ampicillin for GBS ppx during IOL 3/6  - Received betamethasone course 2/19-2/20

## 2024-02-28 NOTE — CHART NOTE - NSCHARTNOTEFT_GEN_A_CORE
Baseline 135 mod ju pos accels no decels  toco quiet  reactive NST Baseline 135 mod ju pos accels no decels  no contractions  reactive NST

## 2024-02-29 PROCEDURE — 99232 SBSQ HOSP IP/OBS MODERATE 35: CPT | Mod: GC

## 2024-02-29 RX ORDER — ACETAMINOPHEN 500 MG
975 TABLET ORAL ONCE
Refills: 0 | Status: COMPLETED | OUTPATIENT
Start: 2024-02-29 | End: 2024-02-29

## 2024-02-29 RX ADMIN — Medication 975 MILLIGRAM(S): at 10:00

## 2024-02-29 RX ADMIN — Medication 30 MILLIGRAM(S): at 20:07

## 2024-02-29 RX ADMIN — Medication 1 TABLET(S): at 17:48

## 2024-02-29 RX ADMIN — Medication 975 MILLIGRAM(S): at 09:22

## 2024-02-29 RX ADMIN — HEPARIN SODIUM 5000 UNIT(S): 5000 INJECTION INTRAVENOUS; SUBCUTANEOUS at 05:19

## 2024-02-29 RX ADMIN — HEPARIN SODIUM 5000 UNIT(S): 5000 INJECTION INTRAVENOUS; SUBCUTANEOUS at 17:48

## 2024-02-29 RX ADMIN — SODIUM CHLORIDE 3 MILLILITER(S): 9 INJECTION INTRAMUSCULAR; INTRAVENOUS; SUBCUTANEOUS at 23:00

## 2024-02-29 NOTE — PROGRESS NOTE ADULT - PROBLEM SELECTOR PLAN 1
- Dating by last menstrual period  - Last EFW on 2/21/24 was 1173g (23%ile)  - Continue daily prenatal vitamins  - GBS+ by PCR swab - plan to start intrapartum ampicillin for GBS ppx during IOL 3/6  - Received betamethasone course 2/19-2/20. To receive rescue betamethasone course 3/4-3/5

## 2024-02-29 NOTE — PROGRESS NOTE ADULT - SUBJECTIVE AND OBJECTIVE BOX
YOLY JERONIMO  [facility] DELV OBWR 02    43 year old  at 33w1d by last menstrual period 23, estimated due date 24 admitted for preeclampsia with severe features now s/p 24h course of magnesium, s/p labetalol IV push 20 mg, 40 mg, on standing Procardia 30XL, s/p BMZ (-)    Subjective:  She reports feeling fetal movement. She denies contractions, leakage of fluid, vaginal bleeding.   She denies headaches, visual disturbances, RUQ pain, epigastric pain and new-onset edema. Denies drowsiness, somnolence and SOB.    Vital Signs Last 24 Hrs  T(C): 36.8 (2024 05:12), Max: 37.2 (2024 23:37)  T(F): 98.3 (2024 05:12), Max: 98.9 (2024 23:37)  HR: 99 (2024 05:12) (87 - 102)  BP: 123/88 (2024 05:12) (121/78 - 142/89)  BP(mean): --  RR: 17 (2024 05:12) (16 - 18)  SpO2: 96% (2024 05:12) (90% - 99%)    Parameters below as of 2024 05:12  Patient On (Oxygen Delivery Method): room air    Height (cm): 165.1 (24 @ 19:27)  Weight (kg): 99.8 (24 @ 19:27)  BMI (kg/m2): 36.6 (24 @ 19:27)  BSA (m2): 2.06 (24 @ 19:27)    NST not performed overnight  NST yesterday AM: 135, moderate variability, +accels, -decels  [place]: None    Physical Exam:  General: Adult female in NAD  Lungs: CTAB, no wheezing, rhonchi or rales  Abdomen: soft, non-tender, gravid uterus  Pelvic: Deferred  Ext: Trace edema. No cyanosis, calf tenderness  Skin: No rashes or lesions on exposed skin  Neuro: Normal DTRs, grossly intact    Labs:                                12.3   7.41  )-----------( 355      ( 2024 06:08 )             37.3         135  |  102  |  10.0  ----------------------------<  98  4.0   |  19.0<L>  |  0.31<L>    Ca    8.9      2024 06:08    TPro  6.1<L>  /  Alb  3.3  /  TBili  <0.2<L>  /  DBili  x   /  AST  20  /  ALT  17  /  AlkPhos  107      MEDICATIONS  (STANDING):  chlorhexidine 2% Cloths 1 Application(s) Topical daily  citric acid/sodium citrate Solution 30 milliLiter(s) Oral once  heparin   Injectable 5000 Unit(s) SubCutaneous every 12 hours  lactated ringers. 1000 milliLiter(s) (125 mL/Hr) IV Continuous <Continuous>  magnesium sulfate Infusion 2 Gm/Hr (50 mL/Hr) IV Continuous <Continuous>  NIFEdipine XL 30 milliGRAM(s) Oral every 24 hours  oxytocin Infusion 333.333 milliUNIT(s)/Min (1000 mL/Hr) IV Continuous <Continuous>  oxytocin Infusion.  milliUNIT(s)/Min (2 mL/Hr) IV Continuous <Continuous>  prenatal multivitamin 1 Tablet(s) Oral daily  sodium chloride 0.9% lock flush 3 milliLiter(s) IV Push every 8 hours    MEDICATIONS  (PRN):  calcium carbonate    500 mG (Tums) Chewable 1 Tablet(s) Chew every 6 hours PRN Heartburn

## 2024-02-29 NOTE — PROGRESS NOTE ADULT - PROBLEM SELECTOR PLAN 4
- Daily prenatal vitamins  - Keep type and screen active, next due 3/2   - DVT ppx: encourage ambulation, SCDs when in bed, receiving subq heparin  - NST BID

## 2024-02-29 NOTE — PROGRESS NOTE ADULT - PROBLEM SELECTOR PLAN 2
- Multiple severe range blood pressures in triage, responded to IVP labetalol 20/40mg, now on Procardia 30XL qhs. Had severe range BPs overnight 2/22 over >1hr apart, not requiring pushes. No severe ranges in the past 24h. Titrate to maintain blood pressures <140/90 as able. Last 24h BPs (121/78 - 142/89)  - Received 24h of magnesium for seizure prophylaxis  - Admit labs and repeat labs 2/20, 2/21, 2/22, 2/28 unremarkable, PC ratio 0.2. Repeat CBC, CMP on 3/5.

## 2024-02-29 NOTE — PROGRESS NOTE ADULT - ASSESSMENT
43 year old  at 33w1d by last menstrual period 23, estimated due date 24 admitted for preeclampsia with severe features now s/p 24h course of magnesium, s/p labetalol IV push 20 mg, 40 mg, on standing Procardia 30XL, s/p BMZ (-)

## 2024-03-01 RX ADMIN — Medication 1 TABLET(S): at 18:22

## 2024-03-01 RX ADMIN — Medication 30 MILLIGRAM(S): at 20:47

## 2024-03-01 RX ADMIN — SODIUM CHLORIDE 3 MILLILITER(S): 9 INJECTION INTRAMUSCULAR; INTRAVENOUS; SUBCUTANEOUS at 21:05

## 2024-03-01 RX ADMIN — HEPARIN SODIUM 5000 UNIT(S): 5000 INJECTION INTRAVENOUS; SUBCUTANEOUS at 05:21

## 2024-03-01 RX ADMIN — Medication 1 TABLET(S): at 12:22

## 2024-03-01 RX ADMIN — HEPARIN SODIUM 5000 UNIT(S): 5000 INJECTION INTRAVENOUS; SUBCUTANEOUS at 18:18

## 2024-03-01 NOTE — PROGRESS NOTE ADULT - SUBJECTIVE AND OBJECTIVE BOX
YOLY JERONIMO  [facility] DELV OBWR 02    43 year old  at 33w1d by last menstrual period 23, estimated due date 24 admitted for preeclampsia with severe features now s/p 24h course of magnesium, s/p labetalol IV push 20 mg, 40 mg, on standing Procardia 30XL, s/p BMZ (-)    Subjective:  She reports feeling fetal movement. She denies contractions, leakage of fluid, vaginal bleeding.   She denies headaches, visual disturbances, RUQ pain, epigastric pain and new-onset edema. Denies drowsiness, somnolence and SOB.    Vital Signs Last 24 Hrs  T(C): 36.8 (01 Mar 2024 03:24), Max: 36.9 (2024 20:00)  T(F): 98.3 (01 Mar 2024 03:24), Max: 98.4 (2024 20:00)  HR: 87 (01 Mar 2024 03:24) (79 - 95)  BP: 115/74 (01 Mar 2024 03:24) (115/74 - 148/88)  RR: 17 (01 Mar 2024 03:24) (16 - 18)  SpO2: 97% (01 Mar 2024 03:24) (96% - 98%)    Parameters below as of 01 Mar 2024 03:24  Patient On (Oxygen Delivery Method): room air    NST reactive overnight  [place]: None    Physical Exam:  General: Adult female in NAD  Neuro: A&Ox3  Resp: breathing comfortably on RA   Pelvic: Deferred  Ext: Trace edema. No cyanosis, calf tenderness  Skin: No rashes or lesions on exposed skin    Labs:                              12.3   7.41  )-----------( 355      ( 2024 06:08 )             37.3     -    135  |  102  |  10.0  ----------------------------<  98  4.0   |  19.0<L>  |  0.31<L>    Ca    8.9      2024 06:08    TPro  6.1<L>  /  Alb  3.3  /  TBili  <0.2<L>  /  DBili  x   /  AST  20  /  ALT  17  /  AlkPhos  107     YOLY JERONIMO  [facility] DELV OBWR 02    43 year old  at 33w2d by last menstrual period 23, estimated due date 24 admitted for preeclampsia with severe features now s/p 24h course of magnesium, s/p labetalol IV push 20 mg, 40 mg, on standing Procardia 30XL, s/p BMZ (-)    Subjective:  She reports feeling fetal movement. She denies contractions, leakage of fluid, vaginal bleeding.   She denies headaches, visual disturbances, RUQ pain, epigastric pain and new-onset edema. Denies drowsiness, somnolence and SOB.    Vital Signs Last 24 Hrs  T(C): 36.8 (01 Mar 2024 03:24), Max: 36.9 (2024 20:00)  T(F): 98.3 (01 Mar 2024 03:24), Max: 98.4 (2024 20:00)  HR: 87 (01 Mar 2024 03:24) (79 - 95)  BP: 115/74 (01 Mar 2024 03:24) (115/74 - 148/88)  RR: 17 (01 Mar 2024 03:24) (16 - 18)  SpO2: 97% (01 Mar 2024 03:24) (96% - 98%)    Parameters below as of 01 Mar 2024 03:24  Patient On (Oxygen Delivery Method): room air    NST 0000: 145, moderate variability, +accels, -decels  [place]: None    Physical Exam:  General: Adult female in NAD  Neuro: A&Ox3  Resp: breathing comfortably on RA   Pelvic: Deferred  Ext: Trace edema. No cyanosis, calf tenderness  Skin: No rashes or lesions on exposed skin    Labs:                              12.3   7.41  )-----------( 355      ( 2024 06:08 )             37.3     -    135  |  102  |  10.0  ----------------------------<  98  4.0   |  19.0<L>  |  0.31<L>    Ca    8.9      2024 06:08    TPro  6.1<L>  /  Alb  3.3  /  TBili  <0.2<L>  /  DBili  x   /  AST  20  /  ALT  17  /  AlkPhos  107

## 2024-03-01 NOTE — PROGRESS NOTE ADULT - PROBLEM SELECTOR PLAN 2
- Multiple severe range blood pressures in triage, responded to IVP labetalol 20/40mg, now on Procardia 30XL qhs. Had severe range BPs overnight 2/22 over >1hr apart, not requiring pushes. No severe ranges in the past 24h. Titrate to maintain blood pressures <140/90 as able.   - Received 24h of magnesium for seizure prophylaxis  - Admit labs and repeat labs 2/20, 2/21, 2/22, 2/28 unremarkable, PC ratio 0.2. Repeat CBC, CMP on 3/5.

## 2024-03-01 NOTE — PROGRESS NOTE ADULT - ASSESSMENT
43 year old  at 33w1d by last menstrual period 23, estimated due date 24 admitted for preeclampsia with severe features now s/p 24h course of magnesium, s/p labetalol IV push 20 mg, 40 mg, on standing Procardia 30XL, s/p BMZ (-) 43 year old  at 33w2d by last menstrual period 23, estimated due date 24 admitted for preeclampsia with severe features now s/p 24h course of magnesium, s/p labetalol IV push 20 mg, 40 mg, on standing Procardia 30XL, s/p BMZ (-)

## 2024-03-02 LAB — BLD GP AB SCN SERPL QL: SIGNIFICANT CHANGE UP

## 2024-03-02 RX ADMIN — Medication 30 MILLIGRAM(S): at 20:50

## 2024-03-02 RX ADMIN — Medication 1 TABLET(S): at 11:35

## 2024-03-02 RX ADMIN — SODIUM CHLORIDE 3 MILLILITER(S): 9 INJECTION INTRAMUSCULAR; INTRAVENOUS; SUBCUTANEOUS at 14:00

## 2024-03-02 RX ADMIN — HEPARIN SODIUM 5000 UNIT(S): 5000 INJECTION INTRAVENOUS; SUBCUTANEOUS at 05:12

## 2024-03-02 RX ADMIN — SODIUM CHLORIDE 3 MILLILITER(S): 9 INJECTION INTRAMUSCULAR; INTRAVENOUS; SUBCUTANEOUS at 06:43

## 2024-03-02 RX ADMIN — HEPARIN SODIUM 5000 UNIT(S): 5000 INJECTION INTRAVENOUS; SUBCUTANEOUS at 17:29

## 2024-03-02 RX ADMIN — SODIUM CHLORIDE 3 MILLILITER(S): 9 INJECTION INTRAMUSCULAR; INTRAVENOUS; SUBCUTANEOUS at 22:48

## 2024-03-02 NOTE — PROGRESS NOTE ADULT - ASSESSMENT
43 year old  at 33w3d by last menstrual period 23, estimated due date 24 admitted for preeclampsia with severe features now s/p 24h course of magnesium, s/p labetalol IV push 20 mg, 40 mg, on standing Procardia 30XL, s/p BMZ (-)

## 2024-03-02 NOTE — PROGRESS NOTE ADULT - SUBJECTIVE AND OBJECTIVE BOX
YOLY JERONIMO  [facility] DELV OBWR 02    43 year old  at 33w3d by last menstrual period 23, estimated due date 24 admitted for preeclampsia with severe features now s/p 24h course of magnesium, s/p labetalol IV push 20 mg, 40 mg, on standing Procardia 30XL, s/p BMZ (-)    Subjective:  She reports feeling fetal movement. She denies contractions, leakage of fluid, vaginal bleeding.   She denies headaches, visual disturbances, RUQ pain, epigastric pain and new-onset edema. Denies SOB, lightheadedness/dizziness/CP/SOB.    Vital Signs Last 24 Hrs  T(C): 36.9 (02 Mar 2024 04:01), Max: 36.9 (02 Mar 2024 04:01)  T(F): 98.4 (02 Mar 2024 04:01), Max: 98.4 (02 Mar 2024 04:01)  HR: 90 (02 Mar 2024 04:01) (79 - 91)  BP: 119/72 (02 Mar 2024 04:01) (119/72 - 143/87)  RR: 18 (02 Mar 2024 04:01) (18 - 18)  SpO2: 97% (02 Mar 2024 04:01) (91% - 99%)    Parameters below as of 02 Mar 2024 04:01  Patient On (Oxygen Delivery Method): room air    Physical Exam:  General: Adult female in NAD  Neuro: A&Ox3  Resp: breathing comfortably on RA   Pelvic: Deferred  Ext: Trace edema. No cyanosis, calf tenderness  Skin: No rashes or lesions on exposed skin    FHT baseline 135, mod variability, +accels, -decels  [place] w/o ctx    Labs:                              12.3   7.41  )-----------( 355      ( 2024 06:08 )             37.3         135  |  102  |  10.0  ----------------------------<  98  4.0   |  19.0<L>  |  0.31<L>    Ca    8.9      2024 06:08    TPro  6.1<L>  /  Alb  3.3  /  TBili  <0.2<L>  /  DBili  x   /  AST  20  /  ALT  17  /  AlkPhos  107

## 2024-03-02 NOTE — PROGRESS NOTE ADULT - PROBLEM SELECTOR PLAN 4
- Daily prenatal vitamins  - Keep type and screen active, rpt pending teday  - DVT ppx: encourage ambulation, SCDs when in bed, receiving subq heparin  - NST BID, reactive overnight

## 2024-03-03 RX ADMIN — HEPARIN SODIUM 5000 UNIT(S): 5000 INJECTION INTRAVENOUS; SUBCUTANEOUS at 18:18

## 2024-03-03 RX ADMIN — SODIUM CHLORIDE 3 MILLILITER(S): 9 INJECTION INTRAMUSCULAR; INTRAVENOUS; SUBCUTANEOUS at 06:21

## 2024-03-03 RX ADMIN — SODIUM CHLORIDE 3 MILLILITER(S): 9 INJECTION INTRAMUSCULAR; INTRAVENOUS; SUBCUTANEOUS at 22:44

## 2024-03-03 RX ADMIN — Medication 30 MILLIGRAM(S): at 21:00

## 2024-03-03 RX ADMIN — Medication 1 TABLET(S): at 12:09

## 2024-03-03 RX ADMIN — HEPARIN SODIUM 5000 UNIT(S): 5000 INJECTION INTRAVENOUS; SUBCUTANEOUS at 05:22

## 2024-03-03 NOTE — PROGRESS NOTE ADULT - ASSESSMENT
43 year old  at 33w4d by last menstrual period 23, estimated due date 24 admitted for preeclampsia with severe features now s/p 24h course of magnesium, s/p labetalol IV push 20 mg, 40 mg, on standing Procardia 30XL, s/p BMZ (-)

## 2024-03-03 NOTE — PROGRESS NOTE ADULT - SUBJECTIVE AND OBJECTIVE BOX
YOLY JERONIMO  [facility] DELV OBWR 02    43 year old  at 33w4d by last menstrual period 23, estimated due date 24 admitted for preeclampsia with severe features now s/p 24h course of magnesium, s/p labetalol IV push 20 mg, 40 mg, on standing Procardia 30XL, s/p BMZ (-)    Subjective:  She reports feeling fetal movement. She denies contractions, leakage of fluid, vaginal bleeding.   She denies headaches, visual disturbances, RUQ pain, epigastric pain and new-onset edema. Denies SOB, lightheadedness/dizziness/CP/SOB.    Vital Signs Last 24 Hrs  T(C): 36.3 (03 Mar 2024 04:59), Max: 36.8 (02 Mar 2024 23:53)  T(F): 97.4 (03 Mar 2024 04:59), Max: 98.3 (02 Mar 2024 23:53)  HR: 96 (03 Mar 2024 04:59) (78 - 96)  BP: 135/84 (03 Mar 2024 04:59) (118/81 - 136/88)  BP(mean): --  RR: 18 (03 Mar 2024 04:59) (18 - 19)  SpO2: 98% (03 Mar 2024 04:59) (95% - 98%)    Parameters below as of 02 Mar 2024 19:45  Patient On (Oxygen Delivery Method): room air      Physical Exam:  General: Adult female in NAD  Neuro: A&Ox3  Resp: breathing comfortably on RA   Abd: soft, nondistended; gravid uterus   Ext: Trace edema. No cyanosis, calf tenderness  Skin: No rashes or lesions on exposed skin    FHT baseline 140, mod variability, +accels, -decels  [place] w/o ctx    Labs:

## 2024-03-03 NOTE — PROGRESS NOTE ADULT - PROBLEM SELECTOR PLAN 4
- Daily prenatal vitamins  - Keep type and screen active  - DVT ppx: encourage ambulation, SCDs when in bed, receiving subq heparin  - NST BID, reactive overnight

## 2024-03-04 PROCEDURE — 99232 SBSQ HOSP IP/OBS MODERATE 35: CPT | Mod: GC

## 2024-03-04 RX ADMIN — SODIUM CHLORIDE 3 MILLILITER(S): 9 INJECTION INTRAMUSCULAR; INTRAVENOUS; SUBCUTANEOUS at 18:57

## 2024-03-04 RX ADMIN — Medication 1 TABLET(S): at 12:17

## 2024-03-04 RX ADMIN — HEPARIN SODIUM 5000 UNIT(S): 5000 INJECTION INTRAVENOUS; SUBCUTANEOUS at 05:20

## 2024-03-04 RX ADMIN — SODIUM CHLORIDE 3 MILLILITER(S): 9 INJECTION INTRAMUSCULAR; INTRAVENOUS; SUBCUTANEOUS at 05:50

## 2024-03-04 RX ADMIN — Medication 12 MILLIGRAM(S): at 05:20

## 2024-03-04 RX ADMIN — HEPARIN SODIUM 5000 UNIT(S): 5000 INJECTION INTRAVENOUS; SUBCUTANEOUS at 18:09

## 2024-03-04 RX ADMIN — Medication 30 MILLIGRAM(S): at 20:51

## 2024-03-04 NOTE — PROGRESS NOTE ADULT - PROBLEM SELECTOR PROBLEM 1
33 weeks gestation of pregnancy Tissue Cultured Epidermal Autograft Text: The defect edges were debeveled with a #15 scalpel blade.  Given the location of the defect, shape of the defect and the proximity to free margins a tissue cultured epidermal autograft was deemed most appropriate.  The graft was then trimmed to fit the size of the defect.  The graft was then placed in the primary defect and oriented appropriately.

## 2024-03-04 NOTE — CHART NOTE - NSCHARTNOTEFT_GEN_A_CORE
AM NST not performed due to acuity of L&D.  Bedside US performed with BPP 8/8, umbilical artery dopplers wnl and EFW 11%tile.  Plan for PM NST.    Discussed with Dr. Katz

## 2024-03-04 NOTE — PROGRESS NOTE ADULT - PROBLEM SELECTOR PLAN 1
- Dating by last menstrual period  - Last EFW on 2/21/24 was 1173g (23%ile)  - Continue daily prenatal vitamins  - GBS+ by PCR swab - plan to start intrapartum ampicillin for GBS ppx during IOL 3/6  - Received betamethasone course 2/19-2/20. Plan for rescue betamethasone course 3/4-3/5

## 2024-03-04 NOTE — PROGRESS NOTE ADULT - SUBJECTIVE AND OBJECTIVE BOX
43 year old  at 33w5d by last menstrual period 23, estimated due date 24 admitted for preeclampsia with severe features now s/p 24h course of magnesium, s/p labetalol IV push 20 mg, 40 mg, on standing Procardia 30XL, s/p BMZ (-).    Subjective:  She reports feeling fetal movement. She denies contractions, leakage of fluid, vaginal bleeding.   She denies headaches, visual disturbances, RUQ pain, epigastric pain and new-onset edema. Denies SOB, lightheadedness/dizziness/CP/SOB.    Vital Signs Last 24 Hrs  Vital Signs Last 24 Hrs  T(C): 36.9 (04 Mar 2024 04:55), Max: 36.9 (03 Mar 2024 08:34)  T(F): 98.5 (04 Mar 2024 04:55), Max: 98.5 (04 Mar 2024 04:55)  HR: 91 (04 Mar 2024 04:55) (84 - 102)  BP: 124/80 (04 Mar 2024 04:55) (124/80 - 157/96)  RR: 18 (04 Mar 2024 04:55) (17 - 18)  SpO2: 96% (04 Mar 2024 04:55) (95% - 98%)    Parameters below as of 03 Mar 2024 20:35  Patient On (Oxygen Delivery Method): room air    Physical Exam:  General: Adult female in NAD  Neuro: A&Ox3  Resp: breathing comfortably on RA   Abd: soft, nondistended; gravid uterus   Ext: Trace edema. No cyanosis, calf tenderness  Skin: No rashes or lesions on exposed skin    NST overnight:  FHT: baseline 130, mod variability, +accels, -decels  [place]: no CTX    Labs:

## 2024-03-04 NOTE — PROGRESS NOTE ADULT - PROBLEM SELECTOR PLAN 2
- Multiple severe range blood pressures in triage, responded to IVP labetalol 20/40mg, now on Procardia 30XL qhs. Had severe range BPs overnight 2/22 over >1hr apart, not requiring pushes. No severe ranges in the past 24h. BPs high mild range from 124-157/80-96. Continue to monitor [id number] and titrate to maintain blood pressures <140/90 as able.   - Received 24h of magnesium for seizure prophylaxis  - Admit labs and repeat labs 2/20, 2/21, 2/22, 2/28 unremarkable, PC ratio 0.2. Repeat CBC, CMP on 3/5.

## 2024-03-04 NOTE — PROGRESS NOTE ADULT - ASSESSMENT
43 year old  at 33w5d by last menstrual period 23, estimated due date 24 admitted for preeclampsia with severe features now s/p 24h course of magnesium, s/p labetalol IV push 20 mg, 40 mg, on standing Procardia 30XL, s/p BMZ (-).

## 2024-03-05 ENCOUNTER — TRANSCRIPTION ENCOUNTER (OUTPATIENT)
Age: 44
End: 2024-03-05

## 2024-03-05 LAB
ALBUMIN SERPL ELPH-MCNC: 3.3 G/DL — SIGNIFICANT CHANGE UP (ref 3.3–5.2)
ALP SERPL-CCNC: 111 U/L — SIGNIFICANT CHANGE UP (ref 40–120)
ALT FLD-CCNC: 14 U/L — SIGNIFICANT CHANGE UP
ANION GAP SERPL CALC-SCNC: 18 MMOL/L — HIGH (ref 5–17)
AST SERPL-CCNC: 17 U/L — SIGNIFICANT CHANGE UP
BASOPHILS # BLD AUTO: 0.04 K/UL — SIGNIFICANT CHANGE UP (ref 0–0.2)
BASOPHILS NFR BLD AUTO: 0.3 % — SIGNIFICANT CHANGE UP (ref 0–2)
BILIRUB SERPL-MCNC: <0.2 MG/DL — LOW (ref 0.4–2)
BLD GP AB SCN SERPL QL: SIGNIFICANT CHANGE UP
BUN SERPL-MCNC: 11.9 MG/DL — SIGNIFICANT CHANGE UP (ref 8–20)
CALCIUM SERPL-MCNC: 9 MG/DL — SIGNIFICANT CHANGE UP (ref 8.4–10.5)
CHLORIDE SERPL-SCNC: 103 MMOL/L — SIGNIFICANT CHANGE UP (ref 96–108)
CO2 SERPL-SCNC: 15 MMOL/L — LOW (ref 22–29)
CREAT SERPL-MCNC: 0.38 MG/DL — LOW (ref 0.5–1.3)
EGFR: 129 ML/MIN/1.73M2 — SIGNIFICANT CHANGE UP
EOSINOPHIL # BLD AUTO: 0.01 K/UL — SIGNIFICANT CHANGE UP (ref 0–0.5)
EOSINOPHIL NFR BLD AUTO: 0.1 % — SIGNIFICANT CHANGE UP (ref 0–6)
GLUCOSE SERPL-MCNC: 147 MG/DL — HIGH (ref 70–99)
HCT VFR BLD CALC: 35.1 % — SIGNIFICANT CHANGE UP (ref 34.5–45)
HGB BLD-MCNC: 11.6 G/DL — SIGNIFICANT CHANGE UP (ref 11.5–15.5)
IMM GRANULOCYTES NFR BLD AUTO: 1.5 % — HIGH (ref 0–0.9)
LYMPHOCYTES # BLD AUTO: 1.77 K/UL — SIGNIFICANT CHANGE UP (ref 1–3.3)
LYMPHOCYTES # BLD AUTO: 15.4 % — SIGNIFICANT CHANGE UP (ref 13–44)
MCHC RBC-ENTMCNC: 28.5 PG — SIGNIFICANT CHANGE UP (ref 27–34)
MCHC RBC-ENTMCNC: 33 GM/DL — SIGNIFICANT CHANGE UP (ref 32–36)
MCV RBC AUTO: 86.2 FL — SIGNIFICANT CHANGE UP (ref 80–100)
MONOCYTES # BLD AUTO: 0.67 K/UL — SIGNIFICANT CHANGE UP (ref 0–0.9)
MONOCYTES NFR BLD AUTO: 5.8 % — SIGNIFICANT CHANGE UP (ref 2–14)
NEUTROPHILS # BLD AUTO: 8.81 K/UL — HIGH (ref 1.8–7.4)
NEUTROPHILS NFR BLD AUTO: 76.9 % — SIGNIFICANT CHANGE UP (ref 43–77)
PLATELET # BLD AUTO: 339 K/UL — SIGNIFICANT CHANGE UP (ref 150–400)
POTASSIUM SERPL-MCNC: 4.3 MMOL/L — SIGNIFICANT CHANGE UP (ref 3.5–5.3)
POTASSIUM SERPL-SCNC: 4.3 MMOL/L — SIGNIFICANT CHANGE UP (ref 3.5–5.3)
PROT SERPL-MCNC: 6.5 G/DL — LOW (ref 6.6–8.7)
RBC # BLD: 4.07 M/UL — SIGNIFICANT CHANGE UP (ref 3.8–5.2)
RBC # FLD: 14.5 % — SIGNIFICANT CHANGE UP (ref 10.3–14.5)
SODIUM SERPL-SCNC: 136 MMOL/L — SIGNIFICANT CHANGE UP (ref 135–145)
WBC # BLD: 11.47 K/UL — HIGH (ref 3.8–10.5)
WBC # FLD AUTO: 11.47 K/UL — HIGH (ref 3.8–10.5)

## 2024-03-05 PROCEDURE — 99232 SBSQ HOSP IP/OBS MODERATE 35: CPT | Mod: GC

## 2024-03-05 RX ORDER — ONDANSETRON 8 MG/1
4 TABLET, FILM COATED ORAL ONCE
Refills: 0 | Status: COMPLETED | OUTPATIENT
Start: 2024-03-05 | End: 2024-03-06

## 2024-03-05 RX ADMIN — Medication 30 MILLIGRAM(S): at 21:11

## 2024-03-05 RX ADMIN — HEPARIN SODIUM 5000 UNIT(S): 5000 INJECTION INTRAVENOUS; SUBCUTANEOUS at 05:38

## 2024-03-05 RX ADMIN — HEPARIN SODIUM 5000 UNIT(S): 5000 INJECTION INTRAVENOUS; SUBCUTANEOUS at 17:50

## 2024-03-05 RX ADMIN — Medication 12 MILLIGRAM(S): at 05:38

## 2024-03-05 RX ADMIN — Medication 1 TABLET(S): at 11:54

## 2024-03-05 NOTE — PROGRESS NOTE ADULT - PROBLEM SELECTOR PLAN 2
- Multiple severe range blood pressures in triage, responded to IVP labetalol 20/40mg, now on Procardia 30XL qhs. Had severe range BPs overnight 2/22 over >1hr apart, not requiring pushes. No severe ranges in the past 24h. BPs high mild range from 131-152/83-85. Continue to monitor [id number] and titrate to maintain blood pressures <140/90 as able.   - Received 24h of magnesium for seizure prophylaxis  - Admit labs and repeat labs 2/20, 2/21, 2/22, 2/28, 3/5 unremarkable, PC ratio 0.2. - Multiple severe range blood pressures in triage, responded to IVP labetalol 20/40mg, now on Procardia 30XL qhs. Had severe range BPs overnight 2/22 over >1hr apart, not requiring pushes. No severe ranges in the past 24h. BPs high mild range from 131-152/83-85. Continue to monitor [id number] and titrate to maintain blood pressures <140/90 as able.   - Received 24h of magnesium for seizure prophylaxis  - Admit labs and repeat labs 2/20, 2/21, 2/22, 2/28, 3/5 unremarkable, PC ratio 0.2.  - Delivery at 34 0/7 weeks of gestation

## 2024-03-05 NOTE — PROGRESS NOTE ADULT - TIME BILLING
Pregnancy complicated by preeclampsia with severe features and other comorbidities.

## 2024-03-05 NOTE — PROGRESS NOTE ADULT - SUBJECTIVE AND OBJECTIVE BOX
43 year old  at 33w6d by last menstrual period 23, estimated due date 24 admitted for preeclampsia with severe features now s/p 24h course of magnesium, s/p labetalol IV push 20 mg, 40 mg, on standing Procardia 30XL, s/p BMZ (-) and rescue BMZ (3/4-3/5).    Subjective:  She reports feeling fetal movement. She denies contractions, leakage of fluid, vaginal bleeding.   She denies headaches, visual disturbances, RUQ pain, epigastric pain and new-onset edema. Denies SOB, lightheadedness/dizziness/CP/SOB.    Vital Signs Last 24 Hrs  T(C): 36.7 (05 Mar 2024 00:52), Max: 36.9 (04 Mar 2024 12:18)  T(F): 98.1 (05 Mar 2024 00:52), Max: 98.5 (04 Mar 2024 12:18)  HR: 95 (05 Mar 2024 00:52) (84 - 112)  BP: 145/83 (05 Mar 2024 00:52) (125/80 - 152/87)  RR: 16 (05 Mar 2024 00:52) (16 - 18)  SpO2: 96% (05 Mar 2024 00:52) (96% - 100%)    Parameters below as of 04 Mar 2024 20:51  Patient On (Oxygen Delivery Method): room air    Physical Exam:  General: Adult female in NAD  Neuro: A&Ox3  Resp: breathing comfortably on RA   Abd: soft, nondistended; gravid uterus   Ext: Trace edema. No cyanosis, calf tenderness  Skin: No rashes or lesions on exposed skin    NST overnight:  FHT: baseline 130, mod variability, +accels, -decels  [place]: no CTX    Labs:                         11.6   11.47 )-----------( 339      ( 05 Mar 2024 04:51 )             35.1                     03-05    136  |  103  |  11.9  ----------------------------<  147<H>  4.3   |  15.0<L>  |  0.38<L>    Ca    9.0      05 Mar 2024 04:51    TPro  6.5<L>  /  Alb  3.3  /  TBili  <0.2<L>  /  DBili  x   /  AST  17  /  ALT  14  /  AlkPhos  111  03-05    43 year old  at 33w6d by last menstrual period 23, estimated due date 24 admitted for preeclampsia with severe features. She is s/p 24h course of magnesium, s/p labetalol IV push 20 mg, 40 mg, on standing Procardia 30XL, s/p BMZ (-) and rescue BMZ (3/4-3/5).    Subjective:  She reports feeling fetal movement. She denies contractions, leakage of fluid, vaginal bleeding.   She denies headaches, visual disturbances, RUQ pain, epigastric pain and new-onset edema. Denies SOB, lightheadedness/dizziness/CP/SOB.    Vital Signs Last 24 Hrs  T(C): 36.7 (05 Mar 2024 00:52), Max: 36.9 (04 Mar 2024 12:18)  T(F): 98.1 (05 Mar 2024 00:52), Max: 98.5 (04 Mar 2024 12:18)  HR: 95 (05 Mar 2024 00:52) (84 - 112)  BP: 145/83 (05 Mar 2024 00:52) (125/80 - 152/87)  RR: 16 (05 Mar 2024 00:52) (16 - 18)  SpO2: 96% (05 Mar 2024 00:52) (96% - 100%)    Parameters below as of 04 Mar 2024 20:51  Patient On (Oxygen Delivery Method): room air    Physical Exam:  General: Adult female in NAD  Neuro: A&Ox3  Resp: breathing comfortably on RA   Abd: soft, nondistended; gravid uterus   Ext: Trace edema. No cyanosis, calf tenderness  Skin: No rashes or lesions on exposed skin    NST overnight:  FHT: baseline 130, mod variability, +accels, -decels  [place]: no CTX    Labs:                         11.6   11.47 )-----------( 339      ( 05 Mar 2024 04:51 )             35.1                     03-05    136  |  103  |  11.9  ----------------------------<  147<H>  4.3   |  15.0<L>  |  0.38<L>    Ca    9.0      05 Mar 2024 04:51    TPro  6.5<L>  /  Alb  3.3  /  TBili  <0.2<L>  /  DBili  x   /  AST  17  /  ALT  14  /  AlkPhos  111  03-05

## 2024-03-05 NOTE — PROGRESS NOTE ADULT - PROBLEM SELECTOR PLAN 1
- Dating by last menstrual period  - Last EFW on 2/21/24 was 1173g (23%ile)  - Continue daily prenatal vitamins  - GBS+ by PCR swab - plan to start intrapartum ampicillin for GBS ppx during IOL 3/6  - Received betamethasone course 2/19-2/20 and rescue betamethasone course 3/4-3/5 - Dating by last menstrual period  - Last EFW on 2/21/24 was 1173g (23%ile)  - Continue daily prenatal vitamins  - GBS+ by PCR swab - plan to start intrapartum ampicillin for GBS ppx during IOL 3/6  - Received betamethasone course 2/19-2/20/24 and rescue betamethasone course 3/4-3/5/24

## 2024-03-05 NOTE — PROGRESS NOTE ADULT - ASSESSMENT
43 year old  at 33w6d by last menstrual period 23, estimated due date 24 admitted for preeclampsia with severe features now s/p 24h course of magnesium, s/p labetalol IV push 20 mg, 40 mg, on standing Procardia 30XL, s/p BMZ (-) and rescue BMZ (3/4-3/5).    Plan to discuss with Dr. Boyle 43 year old  at 33w6d by last menstrual period 23, estimated due date 24 admitted for preeclampsia with severe features. She is s/p 24h course of magnesium, s/p labetalol IV push 20 mg, 40 mg, on standing Procardia 30XL, s/p BMZ (-) and rescue BMZ (3/4-3/5).

## 2024-03-06 LAB
ALBUMIN SERPL ELPH-MCNC: 3.6 G/DL — SIGNIFICANT CHANGE UP (ref 3.3–5.2)
ALP SERPL-CCNC: 115 U/L — SIGNIFICANT CHANGE UP (ref 40–120)
ALT FLD-CCNC: 16 U/L — SIGNIFICANT CHANGE UP
ANION GAP SERPL CALC-SCNC: 18 MMOL/L — HIGH (ref 5–17)
APTT BLD: 24 SEC — LOW (ref 24.5–35.6)
AST SERPL-CCNC: 17 U/L — SIGNIFICANT CHANGE UP
BASOPHILS # BLD AUTO: 0.04 K/UL — SIGNIFICANT CHANGE UP (ref 0–0.2)
BASOPHILS NFR BLD AUTO: 0.3 % — SIGNIFICANT CHANGE UP (ref 0–2)
BILIRUB SERPL-MCNC: <0.2 MG/DL — LOW (ref 0.4–2)
BUN SERPL-MCNC: 11.2 MG/DL — SIGNIFICANT CHANGE UP (ref 8–20)
CALCIUM SERPL-MCNC: 8.1 MG/DL — LOW (ref 8.4–10.5)
CHLORIDE SERPL-SCNC: 101 MMOL/L — SIGNIFICANT CHANGE UP (ref 96–108)
CO2 SERPL-SCNC: 18 MMOL/L — LOW (ref 22–29)
CREAT SERPL-MCNC: 0.35 MG/DL — LOW (ref 0.5–1.3)
EGFR: 132 ML/MIN/1.73M2 — SIGNIFICANT CHANGE UP
EOSINOPHIL # BLD AUTO: 0.03 K/UL — SIGNIFICANT CHANGE UP (ref 0–0.5)
EOSINOPHIL NFR BLD AUTO: 0.3 % — SIGNIFICANT CHANGE UP (ref 0–6)
FIBRINOGEN PPP-MCNC: 456 MG/DL — HIGH (ref 200–450)
GLUCOSE SERPL-MCNC: 102 MG/DL — HIGH (ref 70–99)
HCT VFR BLD CALC: 37.1 % — SIGNIFICANT CHANGE UP (ref 34.5–45)
HGB BLD-MCNC: 12.4 G/DL — SIGNIFICANT CHANGE UP (ref 11.5–15.5)
IMM GRANULOCYTES NFR BLD AUTO: 1.8 % — HIGH (ref 0–0.9)
INR BLD: 0.81 RATIO — LOW (ref 0.85–1.18)
LDH SERPL L TO P-CCNC: 193 U/L — HIGH (ref 98–192)
LYMPHOCYTES # BLD AUTO: 18.4 % — SIGNIFICANT CHANGE UP (ref 13–44)
LYMPHOCYTES # BLD AUTO: 2.2 K/UL — SIGNIFICANT CHANGE UP (ref 1–3.3)
MAGNESIUM SERPL-MCNC: 4.2 MG/DL — HIGH (ref 1.6–2.6)
MCHC RBC-ENTMCNC: 28.5 PG — SIGNIFICANT CHANGE UP (ref 27–34)
MCHC RBC-ENTMCNC: 33.4 GM/DL — SIGNIFICANT CHANGE UP (ref 32–36)
MCV RBC AUTO: 85.3 FL — SIGNIFICANT CHANGE UP (ref 80–100)
MONOCYTES # BLD AUTO: 1.03 K/UL — HIGH (ref 0–0.9)
MONOCYTES NFR BLD AUTO: 8.6 % — SIGNIFICANT CHANGE UP (ref 2–14)
NEUTROPHILS # BLD AUTO: 8.43 K/UL — HIGH (ref 1.8–7.4)
NEUTROPHILS NFR BLD AUTO: 70.6 % — SIGNIFICANT CHANGE UP (ref 43–77)
PLATELET # BLD AUTO: 391 K/UL — SIGNIFICANT CHANGE UP (ref 150–400)
POTASSIUM SERPL-MCNC: 4 MMOL/L — SIGNIFICANT CHANGE UP (ref 3.5–5.3)
POTASSIUM SERPL-SCNC: 4 MMOL/L — SIGNIFICANT CHANGE UP (ref 3.5–5.3)
PROT SERPL-MCNC: 6.9 G/DL — SIGNIFICANT CHANGE UP (ref 6.6–8.7)
PROTHROM AB SERPL-ACNC: 9.1 SEC — LOW (ref 9.5–13)
RBC # BLD: 4.35 M/UL — SIGNIFICANT CHANGE UP (ref 3.8–5.2)
RBC # FLD: 14.6 % — HIGH (ref 10.3–14.5)
SODIUM SERPL-SCNC: 136 MMOL/L — SIGNIFICANT CHANGE UP (ref 135–145)
URATE SERPL-MCNC: 2.3 MG/DL — LOW (ref 2.4–5.7)
WBC # BLD: 11.95 K/UL — HIGH (ref 3.8–10.5)
WBC # FLD AUTO: 11.95 K/UL — HIGH (ref 3.8–10.5)

## 2024-03-06 DEVICE — SURGICEL 2 X 14": Type: IMPLANTABLE DEVICE | Status: FUNCTIONAL

## 2024-03-06 RX ORDER — ONDANSETRON 8 MG/1
4 TABLET, FILM COATED ORAL ONCE
Refills: 0 | Status: DISCONTINUED | OUTPATIENT
Start: 2024-03-06 | End: 2024-03-09

## 2024-03-06 RX ORDER — CEFAZOLIN SODIUM 1 G
2000 VIAL (EA) INJECTION ONCE
Refills: 0 | Status: DISCONTINUED | OUTPATIENT
Start: 2024-03-06 | End: 2024-03-06

## 2024-03-06 RX ORDER — IBUPROFEN 200 MG
600 TABLET ORAL EVERY 6 HOURS
Refills: 0 | Status: COMPLETED | OUTPATIENT
Start: 2024-03-06 | End: 2025-02-02

## 2024-03-06 RX ORDER — SIMETHICONE 80 MG/1
80 TABLET, CHEWABLE ORAL EVERY 4 HOURS
Refills: 0 | Status: DISCONTINUED | OUTPATIENT
Start: 2024-03-06 | End: 2024-03-09

## 2024-03-06 RX ORDER — SODIUM CHLORIDE 9 MG/ML
1000 INJECTION, SOLUTION INTRAVENOUS
Refills: 0 | Status: DISCONTINUED | OUTPATIENT
Start: 2024-03-06 | End: 2024-03-09

## 2024-03-06 RX ORDER — HYDRALAZINE HCL 50 MG
5 TABLET ORAL ONCE
Refills: 0 | Status: DISCONTINUED | OUTPATIENT
Start: 2024-03-06 | End: 2024-03-09

## 2024-03-06 RX ORDER — NIFEDIPINE 30 MG
10 TABLET, EXTENDED RELEASE 24 HR ORAL ONCE
Refills: 0 | Status: COMPLETED | OUTPATIENT
Start: 2024-03-06 | End: 2024-03-06

## 2024-03-06 RX ORDER — SCOPALAMINE 1 MG/3D
1 PATCH, EXTENDED RELEASE TRANSDERMAL ONCE
Refills: 0 | Status: COMPLETED | OUTPATIENT
Start: 2024-03-06 | End: 2024-03-06

## 2024-03-06 RX ORDER — AMPICILLIN TRIHYDRATE 250 MG
2 CAPSULE ORAL ONCE
Refills: 0 | Status: COMPLETED | OUTPATIENT
Start: 2024-03-06 | End: 2024-03-06

## 2024-03-06 RX ORDER — NIFEDIPINE 30 MG
30 TABLET, EXTENDED RELEASE 24 HR ORAL ONCE
Refills: 0 | Status: COMPLETED | OUTPATIENT
Start: 2024-03-06 | End: 2024-03-06

## 2024-03-06 RX ORDER — KETOROLAC TROMETHAMINE 30 MG/ML
30 SYRINGE (ML) INJECTION EVERY 6 HOURS
Refills: 0 | Status: DISCONTINUED | OUTPATIENT
Start: 2024-03-06 | End: 2024-03-08

## 2024-03-06 RX ORDER — ACETAMINOPHEN 500 MG
975 TABLET ORAL ONCE
Refills: 0 | Status: COMPLETED | OUTPATIENT
Start: 2024-03-06 | End: 2024-03-06

## 2024-03-06 RX ORDER — CITRIC ACID/SODIUM CITRATE 300-500 MG
30 SOLUTION, ORAL ORAL ONCE
Refills: 0 | Status: DISCONTINUED | OUTPATIENT
Start: 2024-03-06 | End: 2024-03-09

## 2024-03-06 RX ORDER — MAGNESIUM SULFATE 500 MG/ML
2 VIAL (ML) INJECTION
Qty: 40 | Refills: 0 | Status: DISCONTINUED | OUTPATIENT
Start: 2024-03-06 | End: 2024-03-07

## 2024-03-06 RX ORDER — CEFAZOLIN SODIUM 1 G
2000 VIAL (EA) INJECTION ONCE
Refills: 0 | Status: COMPLETED | OUTPATIENT
Start: 2024-03-06 | End: 2024-03-06

## 2024-03-06 RX ORDER — OXYCODONE HYDROCHLORIDE 5 MG/1
5 TABLET ORAL ONCE
Refills: 0 | Status: DISCONTINUED | OUTPATIENT
Start: 2024-03-06 | End: 2024-03-09

## 2024-03-06 RX ORDER — ACETAMINOPHEN 500 MG
975 TABLET ORAL
Refills: 0 | Status: DISCONTINUED | OUTPATIENT
Start: 2024-03-06 | End: 2024-03-09

## 2024-03-06 RX ORDER — DIPHENHYDRAMINE HCL 50 MG
25 CAPSULE ORAL EVERY 6 HOURS
Refills: 0 | Status: COMPLETED | OUTPATIENT
Start: 2024-03-06 | End: 2025-02-02

## 2024-03-06 RX ORDER — NIFEDIPINE 30 MG
60 TABLET, EXTENDED RELEASE 24 HR ORAL DAILY
Refills: 0 | Status: DISCONTINUED | OUTPATIENT
Start: 2024-03-07 | End: 2024-03-09

## 2024-03-06 RX ORDER — TETANUS TOXOID, REDUCED DIPHTHERIA TOXOID AND ACELLULAR PERTUSSIS VACCINE, ADSORBED 5; 2.5; 8; 8; 2.5 [IU]/.5ML; [IU]/.5ML; UG/.5ML; UG/.5ML; UG/.5ML
0.5 SUSPENSION INTRAMUSCULAR ONCE
Refills: 0 | Status: DISCONTINUED | OUTPATIENT
Start: 2024-03-06 | End: 2024-03-09

## 2024-03-06 RX ORDER — MAGNESIUM SULFATE 500 MG/ML
2 VIAL (ML) INJECTION
Qty: 40 | Refills: 0 | Status: DISCONTINUED | OUTPATIENT
Start: 2024-03-07 | End: 2024-03-09

## 2024-03-06 RX ORDER — SCOPALAMINE 1 MG/3D
1 PATCH, EXTENDED RELEASE TRANSDERMAL ONCE
Refills: 0 | Status: DISCONTINUED | OUTPATIENT
Start: 2024-03-06 | End: 2024-03-09

## 2024-03-06 RX ORDER — ENOXAPARIN SODIUM 100 MG/ML
40 INJECTION SUBCUTANEOUS EVERY 24 HOURS
Refills: 0 | Status: DISCONTINUED | OUTPATIENT
Start: 2024-03-06 | End: 2024-03-09

## 2024-03-06 RX ORDER — FAMOTIDINE 10 MG/ML
20 INJECTION INTRAVENOUS ONCE
Refills: 0 | Status: COMPLETED | OUTPATIENT
Start: 2024-03-06 | End: 2024-03-06

## 2024-03-06 RX ORDER — FAMOTIDINE 10 MG/ML
20 INJECTION INTRAVENOUS ONCE
Refills: 0 | Status: DISCONTINUED | OUTPATIENT
Start: 2024-03-06 | End: 2024-03-09

## 2024-03-06 RX ORDER — LANOLIN
1 OINTMENT (GRAM) TOPICAL EVERY 6 HOURS
Refills: 0 | Status: DISCONTINUED | OUTPATIENT
Start: 2024-03-06 | End: 2024-03-09

## 2024-03-06 RX ORDER — OXYCODONE HYDROCHLORIDE 5 MG/1
5 TABLET ORAL
Refills: 0 | Status: DISCONTINUED | OUTPATIENT
Start: 2024-03-06 | End: 2024-03-09

## 2024-03-06 RX ORDER — OXYTOCIN 10 UNIT/ML
333.33 VIAL (ML) INJECTION
Qty: 20 | Refills: 0 | Status: DISCONTINUED | OUTPATIENT
Start: 2024-03-06 | End: 2024-03-09

## 2024-03-06 RX ORDER — AZITHROMYCIN 500 MG/1
500 TABLET, FILM COATED ORAL ONCE
Refills: 0 | Status: COMPLETED | OUTPATIENT
Start: 2024-03-06 | End: 2024-03-06

## 2024-03-06 RX ORDER — AMPICILLIN TRIHYDRATE 250 MG
1 CAPSULE ORAL EVERY 4 HOURS
Refills: 0 | Status: DISCONTINUED | OUTPATIENT
Start: 2024-03-06 | End: 2024-03-07

## 2024-03-06 RX ORDER — MAGNESIUM HYDROXIDE 400 MG/1
30 TABLET, CHEWABLE ORAL
Refills: 0 | Status: DISCONTINUED | OUTPATIENT
Start: 2024-03-06 | End: 2024-03-09

## 2024-03-06 RX ORDER — MAGNESIUM SULFATE 500 MG/ML
4 VIAL (ML) INJECTION ONCE
Refills: 0 | Status: COMPLETED | OUTPATIENT
Start: 2024-03-06 | End: 2024-03-06

## 2024-03-06 RX ADMIN — Medication 975 MILLIGRAM(S): at 18:52

## 2024-03-06 RX ADMIN — Medication 50 GM/HR: at 08:57

## 2024-03-06 RX ADMIN — Medication 300 GRAM(S): at 08:20

## 2024-03-06 RX ADMIN — Medication 1000 MILLIUNIT(S)/MIN: at 23:00

## 2024-03-06 RX ADMIN — Medication 30 MILLIGRAM(S): at 17:37

## 2024-03-06 RX ADMIN — Medication 108 GRAM(S): at 13:23

## 2024-03-06 RX ADMIN — Medication 2000 MILLIGRAM(S): at 19:39

## 2024-03-06 RX ADMIN — Medication 975 MILLIGRAM(S): at 18:55

## 2024-03-06 RX ADMIN — SCOPALAMINE 1 PATCH: 1 PATCH, EXTENDED RELEASE TRANSDERMAL at 18:58

## 2024-03-06 RX ADMIN — Medication 50 GM/HR: at 22:08

## 2024-03-06 RX ADMIN — Medication 200 GRAM(S): at 09:31

## 2024-03-06 RX ADMIN — Medication 1 TABLET(S): at 12:51

## 2024-03-06 RX ADMIN — SODIUM CHLORIDE 125 MILLILITER(S): 9 INJECTION, SOLUTION INTRAVENOUS at 08:38

## 2024-03-06 RX ADMIN — AZITHROMYCIN 255 MILLIGRAM(S): 500 TABLET, FILM COATED ORAL at 19:43

## 2024-03-06 RX ADMIN — Medication 108 GRAM(S): at 17:51

## 2024-03-06 RX ADMIN — SCOPALAMINE 1 PATCH: 1 PATCH, EXTENDED RELEASE TRANSDERMAL at 20:55

## 2024-03-06 RX ADMIN — SODIUM CHLORIDE 3 MILLILITER(S): 9 INJECTION INTRAMUSCULAR; INTRAVENOUS; SUBCUTANEOUS at 05:36

## 2024-03-06 RX ADMIN — Medication 10 MILLIGRAM(S): at 18:12

## 2024-03-06 RX ADMIN — FAMOTIDINE 20 MILLIGRAM(S): 10 INJECTION INTRAVENOUS at 18:56

## 2024-03-06 RX ADMIN — ONDANSETRON 4 MILLIGRAM(S): 8 TABLET, FILM COATED ORAL at 23:37

## 2024-03-06 NOTE — PROGRESS NOTE ADULT - PROBLEM SELECTOR PLAN 2
- Multiple severe range blood pressures in triage, responded to IVP labetalol 20/40mg, now on Procardia 30XL qhs. Had severe range BPs overnight 2/22 over >1hr apart, not requiring pushes. No severe ranges in the past 24h. BPs high mild range from 130-140s/80s overnight. Continue to monitor [id number] and titrate to maintain blood pressures <140/90 as able.   - Received 24h of magnesium for seizure prophylaxis  - Admit labs and repeat labs 2/20, 2/21, 2/22, 2/28, 3/5 unremarkable, PC ratio 0.2.  - Plan for IOL today at 34 0/7 weeks of gestation

## 2024-03-06 NOTE — OB PROVIDER DELIVERY SUMMARY - NS_BEFORE39WEEKS_OBGYN_ALL_OB
DIANA Campbell advised of Dr. Valencia Plane recommendations and expressed understanding    Adrian [place], 04/24/2017, 7:20 PM
Spoke to patient's Daughter--in-Law  States patient was having a lot of anxiety over the weekend  Called to clinic at some point and spoke to Dr. Leonard Marino who is on call doc  Was told by Dr. Leonard Marino to stop the Zoloft  DIL states Dr. Leonard Marino though [place]
Would recommend that she take only 1/2 of the other pill   Would really like to get her off of klonopin. Doubt her anxiety is the Zoloft however, will see if insurance will now cover Paxil as she has failed Zoloft therapy.
Yes

## 2024-03-06 NOTE — OB RN DELIVERY SUMMARY - NS_GENERALBABYACOMMENTA_OBGYN_ALL_OB_FT
Infant transported to NICU alongside Sullivan and Valentino Infant transported to NICU alongside Dr. Rivera and Valentino, RN.

## 2024-03-06 NOTE — OB PROVIDER DELIVERY SUMMARY - NSPROVIDERDELIVERYNOTE_OBGYN_ALL_OB_FT
Brief  Delivery Summary    Procedure: primary LTCS for 34 wk GA with PECwSFoMg found to be breech while IOL. T incision on uterus due to anterior placenta and difficulty entering with low transverse hysterotomy alone.   Findings: Viable female infant, apgars 6/8, weight 1800, breech presentation. To NICU on CPAP. Grossly normal uterus, fallopian tubes and ovaries.   Two layer uterine closure with monocryl. Rectus, fascia, sub Q closed with suture.  SubQ skin closure  EBL: 818  UOP: 175  Fluids in OR:  1100  Complications: T uterine incision due to difficulty entering the uterus 2/2 anterior placenta Brief  Delivery Summary    Procedure: primary LTCS for 34 wk GA with PECwSFoMg found to be breech while IOL. Inverted T incision on uterus due to anterior placenta and difficulty entering with low transverse hysterotomy alone.   Findings: Viable female infant, apgars 6/8, weight 1800, breech presentation. To NICU on CPAP. Grossly normal uterus, fallopian tubes and ovaries.   Two layer uterine closure with monocryl. Rectus, fascia, sub Q closed with suture.  SubQ skin closure  EBL: 818  UOP: 175  Fluids in OR:  1100    attending addendum agree with above  Started with mid LTCS since BC looked very vascular and friable when palpating, encountered placenta, not enough room to get to fetus, so converted to inverted T, baby delivered footling breech with usual maneuvers. Closed classical part of incsion in two layers, second layer baseball sticth. then closed low transverse and incorporate to closed hysterotomy. Minimal oozing noted so surgicel was used with good response. Rest of procedure as above. Discussed with patient and , that she is not candidate for TOLAC In the future. She verbalized understanding.   ppalos

## 2024-03-06 NOTE — OB RN DELIVERY SUMMARY - NSSELHIDDEN_OBGYN_ALL_OB_FT
[[email or web address]_OB_FT:[id number]],[NS_DeliveryAssist1_OBGYN_ALL_OB_FT:[id number]],[NS_DeliveryRN_OBGYN_ALL_OB_FT:[id number]=]

## 2024-03-06 NOTE — PROGRESS NOTE ADULT - SUBJECTIVE AND OBJECTIVE BOX
43 year old  at 34wks by last menstrual period 23, estimated due date 24 admitted for preeclampsia with severe features. She is s/p 24h course of magnesium, s/p labetalol IV push 20 mg, 40 mg, on standing Procardia 30XL, s/p BMZ (-) and rescue BMZ (3/4-3/5).    Subjective:  She reports feeling fetal movement. She denies contractions, leakage of fluid, vaginal bleeding.   She denies headaches, visual disturbances, RUQ pain, epigastric pain and new-onset edema. Denies SOB, lightheadedness/dizziness/CP/SOB.    Vital Signs Last 24 Hrs  T(C): 36.9 (06 Mar 2024 04:42), Max: 37.1 (06 Mar 2024 00:21)  T(F): 98.5 (06 Mar 2024 04:42), Max: 98.7 (06 Mar 2024 00:21)  HR: 91 (06 Mar 2024 04:42) (91 - 100)  BP: 135/81 (06 Mar 2024 04:42) (130/78 - 148/85)  RR: 18 (06 Mar 2024 04:42) (18 - 18)  SpO2: 96% (06 Mar 2024 04:42) (94% - 97%)    Parameters below as of 06 Mar 2024 04:42  Patient On (Oxygen Delivery Method): room air    Physical Exam:  General: Adult female in NAD  Neuro: A&Ox3  Resp: breathing comfortably on RA   Abd: soft, nondistended; gravid uterus   Ext: Trace edema. No cyanosis, calf tenderness  Skin: No rashes or lesions on exposed skin    NST overnight:  FHT: baseline 140, mod variability, +accels, -decels  [place]: no CTX

## 2024-03-06 NOTE — OB RN INTRAOPERATIVE NOTE - NSSELHIDDEN_OBGYN_ALL_OB_FT
10/4/2023: Jan: The patient is a 75-year-old female with atypical chest pain status post EGD with findings of a small hiatal hernia, but otherwise normal exam.  She was put on famotidine which seemed to be working for her.  She also had concerns regarding constipation.  She underwent a colonoscopy with 3 polyps removed and a 3-year recall recommended.  Diverticular disease was noted along with internal hemorrhoids.  Patient had also been told by her rheumatologist that her being on Simponi may have contributed to concerns of rectal bleed as no active bleeding was noted.  Since no other concerns on colonoscopy, the likely source of her rectal bleed was diverticulosis or hemorrhoids.  Lifestyle modifications including high-fiber diet, adequate water intake was recommended.  The plan was that if lifestyle modifications were not beneficial, dyssynergia work-up will be initiated.  She is here for her 2-month follow-up to discuss further management.  No improvement with stool habits with dietary changes.    She is now on meloxicam for knee pain.    On sennakot at the advice of her pain physician.  ============== 8/3/2023:  Mauriath: The pt had a small hiatal hernia.  Her chest pain symptoms were improved on treatment with famotidine.  EGD was normal.  A manometry study was recommended after the procedure and an order tentatively placed.  The patient declined the procedure.  She is here in clinic to discuss her findings.  Discussed the findings.  Had some questions about her chronic constipation.  Discussed squatty potty, high fiber diet and water intake.  ================================= 6/8/2023:  Mauriath: EGD - Small hiatal hernia but otherwise normal exam. Colonoscopy - Hemorrhoids, diverticulosis and 2 small and one 10 mm polyp removed.  3 year recall recommended.  4/20/2023:  Jan: The pt is a 74 yo F who presents as a referral from her PCP after an ER visit for chest pain.  Was referred to cardiology from the ER and is undergoing cardiac workup including stress test and AAA.  This is still pending from my understanding.  Was also prescribed pepcid which has helped with her symptoms.  Dr. Rasmussen is the cardiologist she saw.   On simponi for RA and started having rectal bleed after starting the first dose of this medication.  No further bleeding after the initial dose.  Dr. Sandhu is her rheumatologist.  Suspected diverticular bleed.  Last colonoscopy was in 2013 with polyps.  She was recommended a colonoscopy in 5 years.  Unfortunately, she was given a cologuard test instead of a colonoscopy in 2018.  This was negative in 2018.  She was not tested thereafter.  Now she presents with rectal bleed and has been referred to us for a colonoscopy.  Other symptoms: Constipation  Smoking hx: Never Alcohol use: Social use FH of GI cancers: No known FH other than gallbladder issues. [NS_DeliveryAttending1_OBGYN_ALL_OB_FT:[id number]],[NS_DeliveryAssist1_OBGYN_ALL_OB_FT:[id number]=],[NS_DeliveryRN_OBGYN_ALL_OB_FT:[id number]=]

## 2024-03-06 NOTE — OB NEONATOLOGY/PEDIATRICIAN DELIVERY SUMMARY - NSPEDSNEONOTESA_OBGYN_ALL_OB_FT
Infant girl born via unscheduled primary  at 34 wk due to maternal pre-eclampsia and breech presentation.  Maternal hx notable for pre-eclampsia and GBS +, s/p 3 doses ampicillin.  Mother given beta x 2 courses ( and 3/5).  Infant emerged limp and floppy.  Cord milking done by OB and then infant brought to warmer.  She was dried warmed and stimulated.  She was limp and floppy with weak cry.  PPV started before 1 minute of life and then continued x 2 minutes.  Infant then with stable respiratory effort and transitioned to CPAP.  Infant with max 20/5 and FiO2 40% and then rapidly decreased to 21%.  Infant admitted to NICU on CPAP 5 21%.  Apgars 6,8

## 2024-03-06 NOTE — PROGRESS NOTE ADULT - PROBLEM SELECTOR PLAN 1
- Dating by last menstrual period  - Last EFW on 2/21/24 was 1173g (23%ile)  - Continue daily prenatal vitamins  - GBS+ by PCR swab - plan to start intrapartum ampicillin for GBS ppx during IOL 3/6  - Received betamethasone course 2/19-2/20/24 and rescue betamethasone course 3/4-3/5/24

## 2024-03-06 NOTE — OB PROVIDER DELIVERY SUMMARY - NSSELHIDDEN_OBGYN_ALL_OB_FT
[NS_DeliveryAttending1_OBGYN_ALL_OB_FT:[id number]],[NS_DeliveryAssist1_OBGYN_ALL_OB_FT:[id number]],[NS_DeliveryRN_OBGYN_ALL_OB_FT:[id number]=]

## 2024-03-06 NOTE — CHART NOTE - NSCHARTNOTEFT_GEN_A_CORE
Attending progress note:  Patient cook balloon was removed as she was about 5cm. However vertex presentation was not felt on exam. Bedside sono showed breech baby with presenting part being umbilical cord. High risk for cord prolapse, so not a candidate for version, will proceed with primary  section. Consents explained and signed. Questions and concerns answered to patient's satisfaction.  ppalos

## 2024-03-06 NOTE — OB PROVIDER LABOR PROGRESS NOTE - NS_SUBJECTIVE/OBJECTIVE_OBGYN_ALL_OB_FT
Pt seen and reevaluated   PECwSF on Mg, required Procardia 10mg IR for severe range BPx2  s/p CB @ 0950  s/p epidural- feeling comfortable

## 2024-03-06 NOTE — OB PROVIDER LABOR PROGRESS NOTE - ASSESSMENT
PECwSFoMg   Cook balloon evaluated, pt noted to be 5cm dilated and balloon noted to be within the vagina   Cervical exam reevaluated, noted to have bulging bag however, fetal head not palpated   Bedside sono done, pt noted to be transverse presentation with significant cord noted to be presenting part   Discussed risks vs benefits of ECV vs primary CS. Given funic presentation, if pt ruptures membranes during ECV, high risk for cord prolapse with emergency CS.   Risks discussed with patient, will opt for primary CS for malpresentation     Discussed w/ Dr. Ku

## 2024-03-06 NOTE — OB RN DELIVERY SUMMARY - NS_SEPSISRSKCALC_OBGYN_ALL_OB_FT
No temperature has been documented for this patient in CPN or on the OB Flowsheet. Ensure the highest temperature during labor was documented on the OB Flowsheet.  No gestational age at birth has been documented. Ensure delivery date/time has been entered above.  Rupture of membranes must be entered above.   EOS calculated successfully. EOS Risk Factor: 0.5/1000 live births ([facility] national incidence); GA=34w;Temp=98.7; ROM=0; GBS='Unknown'; Antibiotics='GBS specific antibiotics > 2 hrs prior to birth'

## 2024-03-06 NOTE — PROGRESS NOTE ADULT - ASSESSMENT
43 year old  at 34wks by last menstrual period 23, estimated due date 24 admitted for preeclampsia with severe features. She is s/p 24h course of magnesium, s/p labetalol IV push 20 mg, 40 mg, on standing Procardia 30XL, s/p BMZ (-) and rescue BMZ (3/4-3/5).

## 2024-03-07 ENCOUNTER — TRANSCRIPTION ENCOUNTER (OUTPATIENT)
Age: 44
End: 2024-03-07

## 2024-03-07 LAB
ALBUMIN SERPL ELPH-MCNC: 3 G/DL — LOW (ref 3.3–5.2)
ALP SERPL-CCNC: 96 U/L — SIGNIFICANT CHANGE UP (ref 40–120)
ALT FLD-CCNC: 16 U/L — SIGNIFICANT CHANGE UP
ANION GAP SERPL CALC-SCNC: 13 MMOL/L — SIGNIFICANT CHANGE UP (ref 5–17)
AST SERPL-CCNC: 23 U/L — SIGNIFICANT CHANGE UP
BASOPHILS # BLD AUTO: 0.06 K/UL — SIGNIFICANT CHANGE UP (ref 0–0.2)
BASOPHILS NFR BLD AUTO: 0.4 % — SIGNIFICANT CHANGE UP (ref 0–2)
BILIRUB SERPL-MCNC: <0.2 MG/DL — LOW (ref 0.4–2)
BUN SERPL-MCNC: 8.6 MG/DL — SIGNIFICANT CHANGE UP (ref 8–20)
CALCIUM SERPL-MCNC: 7.1 MG/DL — LOW (ref 8.4–10.5)
CHLORIDE SERPL-SCNC: 97 MMOL/L — SIGNIFICANT CHANGE UP (ref 96–108)
CO2 SERPL-SCNC: 21 MMOL/L — LOW (ref 22–29)
CREAT SERPL-MCNC: 0.4 MG/DL — LOW (ref 0.5–1.3)
EGFR: 127 ML/MIN/1.73M2 — SIGNIFICANT CHANGE UP
EOSINOPHIL # BLD AUTO: 0 K/UL — SIGNIFICANT CHANGE UP (ref 0–0.5)
EOSINOPHIL NFR BLD AUTO: 0 % — SIGNIFICANT CHANGE UP (ref 0–6)
GLUCOSE SERPL-MCNC: 146 MG/DL — HIGH (ref 70–99)
HCT VFR BLD CALC: 30.1 % — LOW (ref 34.5–45)
HGB BLD-MCNC: 10.2 G/DL — LOW (ref 11.5–15.5)
IMM GRANULOCYTES NFR BLD AUTO: 1 % — HIGH (ref 0–0.9)
LYMPHOCYTES # BLD AUTO: 1.51 K/UL — SIGNIFICANT CHANGE UP (ref 1–3.3)
LYMPHOCYTES # BLD AUTO: 9.6 % — LOW (ref 13–44)
MAGNESIUM SERPL-MCNC: 4.3 MG/DL — HIGH (ref 1.6–2.6)
MAGNESIUM SERPL-MCNC: 4.5 MG/DL — HIGH (ref 1.6–2.6)
MAGNESIUM SERPL-MCNC: 4.9 MG/DL — HIGH (ref 1.6–2.6)
MCHC RBC-ENTMCNC: 29.1 PG — SIGNIFICANT CHANGE UP (ref 27–34)
MCHC RBC-ENTMCNC: 33.9 GM/DL — SIGNIFICANT CHANGE UP (ref 32–36)
MCV RBC AUTO: 85.8 FL — SIGNIFICANT CHANGE UP (ref 80–100)
MONOCYTES # BLD AUTO: 1.03 K/UL — HIGH (ref 0–0.9)
MONOCYTES NFR BLD AUTO: 6.5 % — SIGNIFICANT CHANGE UP (ref 2–14)
NEUTROPHILS # BLD AUTO: 13.02 K/UL — HIGH (ref 1.8–7.4)
NEUTROPHILS NFR BLD AUTO: 82.5 % — HIGH (ref 43–77)
PLATELET # BLD AUTO: 327 K/UL — SIGNIFICANT CHANGE UP (ref 150–400)
POTASSIUM SERPL-MCNC: 4.5 MMOL/L — SIGNIFICANT CHANGE UP (ref 3.5–5.3)
POTASSIUM SERPL-SCNC: 4.5 MMOL/L — SIGNIFICANT CHANGE UP (ref 3.5–5.3)
PROT SERPL-MCNC: 5.6 G/DL — LOW (ref 6.6–8.7)
RBC # BLD: 3.51 M/UL — LOW (ref 3.8–5.2)
RBC # FLD: 14.5 % — SIGNIFICANT CHANGE UP (ref 10.3–14.5)
SODIUM SERPL-SCNC: 131 MMOL/L — LOW (ref 135–145)
WBC # BLD: 15.77 K/UL — HIGH (ref 3.8–10.5)
WBC # FLD AUTO: 15.77 K/UL — HIGH (ref 3.8–10.5)

## 2024-03-07 RX ORDER — ACETAMINOPHEN 500 MG
3 TABLET ORAL
Qty: 30 | Refills: 0
Start: 2024-03-07

## 2024-03-07 RX ORDER — DIPHENHYDRAMINE HCL 50 MG
25 CAPSULE ORAL EVERY 6 HOURS
Refills: 0 | Status: DISCONTINUED | OUTPATIENT
Start: 2024-03-07 | End: 2024-03-09

## 2024-03-07 RX ORDER — NIFEDIPINE 30 MG
1 TABLET, EXTENDED RELEASE 24 HR ORAL
Qty: 30 | Refills: 0
Start: 2024-03-07

## 2024-03-07 RX ORDER — IBUPROFEN 200 MG
1 TABLET ORAL
Qty: 30 | Refills: 0
Start: 2024-03-07

## 2024-03-07 RX ORDER — MAGNESIUM SULFATE 500 MG/ML
2 VIAL (ML) INJECTION
Qty: 40 | Refills: 0 | Status: DISCONTINUED | OUTPATIENT
Start: 2024-03-07 | End: 2024-03-09

## 2024-03-07 RX ADMIN — SCOPALAMINE 1 PATCH: 1 PATCH, EXTENDED RELEASE TRANSDERMAL at 07:59

## 2024-03-07 RX ADMIN — Medication 975 MILLIGRAM(S): at 14:39

## 2024-03-07 RX ADMIN — Medication 60 MILLIGRAM(S): at 06:07

## 2024-03-07 RX ADMIN — SIMETHICONE 80 MILLIGRAM(S): 80 TABLET, CHEWABLE ORAL at 21:26

## 2024-03-07 RX ADMIN — SODIUM CHLORIDE 125 MILLILITER(S): 9 INJECTION, SOLUTION INTRAVENOUS at 14:42

## 2024-03-07 RX ADMIN — Medication 30 MILLIGRAM(S): at 11:57

## 2024-03-07 RX ADMIN — Medication 25 MILLIGRAM(S): at 03:12

## 2024-03-07 RX ADMIN — Medication 975 MILLIGRAM(S): at 08:54

## 2024-03-07 RX ADMIN — Medication 975 MILLIGRAM(S): at 21:13

## 2024-03-07 RX ADMIN — Medication 30 MILLIGRAM(S): at 06:10

## 2024-03-07 RX ADMIN — Medication 1000 MILLIUNIT(S)/MIN: at 00:55

## 2024-03-07 RX ADMIN — Medication 975 MILLIGRAM(S): at 02:24

## 2024-03-07 RX ADMIN — Medication 25 MILLIGRAM(S): at 09:16

## 2024-03-07 RX ADMIN — ENOXAPARIN SODIUM 40 MILLIGRAM(S): 100 INJECTION SUBCUTANEOUS at 09:00

## 2024-03-07 RX ADMIN — Medication 30 MILLIGRAM(S): at 18:02

## 2024-03-07 NOTE — DISCHARGE NOTE OB - PATIENT PORTAL LINK FT
You can access the FollowMyHealth Patient Portal offered by [facility] by registering at the following website: [email or web address]. By joining [facility]’s FollowMyHealth portal, you will also be able to view your health information using other applications (apps) compatible with our system.

## 2024-03-07 NOTE — DISCHARGE NOTE OB - CARE PROVIDERS DIRECT ADDRESSES
[email or web address]

## 2024-03-07 NOTE — PROGRESS NOTE ADULT - ASSESSMENT
A/P:  YOLY JERONIMO is a 43y  now POD#1 s/p primary  section at 34w gestation for PECwSFoMg, found to have fetal malpresentation during IOL. On procardia 60.  -Vital signs stable  -Hgb: 11.6 > 10.2  -Tolerating PO  -Advance care as tolerated   -Continue routine postpartum and postoperative care and education  -Female infant in NICU  -DVT ppx: SCDs in place, lovenox  -Dispo: Anticipate discharge to home 3/8 pending attending approval.

## 2024-03-07 NOTE — DISCHARGE NOTE OB - MEDICATION SUMMARY - MEDICATIONS TO TAKE
I will START or STAY ON the medications listed below when I get home from the hospital:    ibuprofen 600 mg oral tablet  -- 1 tab(s) by mouth every 6 hours  -- Indication: For pain    acetaminophen 325 mg oral tablet  -- 3 tab(s) by mouth every 6 hours  -- Indication: For pain    NIFEdipine 60 mg oral tablet, extended release  -- 1 tab(s) by mouth once a day  -- Indication: For HTN   I will START or STAY ON the medications listed below when I get home from the hospital:    Blood pressure cuff  -- Take BP in AM and PM with arm striaght and feet flat on the floor. Please call MD if BP >140/90  -- Indication: For preclampsia    ibuprofen 600 mg oral tablet  -- 1 tab(s) by mouth every 6 hours  -- Indication: For pain    acetaminophen 325 mg oral tablet  -- 3 tab(s) by mouth every 6 hours  -- Indication: For pain    NIFEdipine 60 mg oral tablet, extended release  -- 1 tab(s) by mouth once a day  -- Indication: For HTN

## 2024-03-07 NOTE — DISCHARGE NOTE OB - CARE PLAN
1 Principal Discharge DX:	 delivery delivered  Secondary Diagnosis:	Severe preeclampsia, third trimester   Principal Discharge DX:	 delivery delivered  Assessment and plan of treatment:	Patient had  section complicated by T incision of uterus. Antepartum admission for preeclampsia until delivery at 34 weeks. Patient should plan to make an appointment with the office within 1 week for an incision check and 6 weeks for postpartum follow-up. Postpartum precautions were given at the time of discharge.  Secondary Diagnosis:	Severe preeclampsia, third trimester   Principal Discharge DX:	 delivery delivered  Assessment and plan of treatment:	Patient had  section complicated by T incision of uterus. Antepartum admission for preeclampsia until delivery at 34 weeks. Patient should plan to make an appointment with the office within 1 week for an incision check and 6 weeks for postpartum follow-up. Postpartum precautions were given at the time of discharge.  Secondary Diagnosis:	Severe preeclampsia, third trimester  Assessment and plan of treatment:	Follow up with your OB for a blood pressure check in 1 week and bring a blood pressure log. Please buy a blood pressure cuff if you do not have one at home and take your blood pressure twice a day while at home and at rest. Continue any prescribed antihypertensive medication as long as blood pressures are above 100/60. You should call your doctor sooner if you develop changes in vision, headache refractory to pain medication, or upper abdominal pain. Please call sooner if there are any other concerns.

## 2024-03-07 NOTE — PROGRESS NOTE ADULT - SUBJECTIVE AND OBJECTIVE BOX
INTERVAL HPI/OVERNIGHT EVENTS:  43y Female s/p c section under epidural anesthesia with duramorph for post op analgesia on 3/6/24    Vital Signs Last 24 Hrs  T(C): 36.6 (07 Mar 2024 10:00), Max: 36.8 (07 Mar 2024 02:04)  T(F): 97.9 (07 Mar 2024 10:00), Max: 98.2 (07 Mar 2024 02:04)  HR: 70 (07 Mar 2024 10:00) (69 - 111)  BP: 110/70 (07 Mar 2024 10:00) (110/70 - 172/98)  BP(mean): --  RR: 20 (07 Mar 2024 10:00) (15 - 21)  SpO2: 97% (07 Mar 2024 10:00) (91% - 99%)    Parameters below as of 07 Mar 2024 06:00  Patient On (Oxygen Delivery Method): room air            Patient's overall anesthesia satisfaction: Positive    Patients pain is well controlled with IT duramorph    No respiratory events overnight    No pruritis at this time    Patient doing well     No headache      No residual numbness or weakness, sensory and motor function intact.    No anesthetic complications or complaints noted or reported          .

## 2024-03-07 NOTE — DISCHARGE NOTE OB - HOSPITAL COURSE
Patient was admitted for preeclampsia with severe features until delivery at 34 weeks. Delivery via c/s for unstable lie as baby flipped to breech during IOL. C/s complicated by T incision on uterus due to anterior placenta.  Post-partum course was uncomplicated. Pain is well controlled with PRN medication. She has no difficulty with ambulation, voiding, or PO intake. Lab values and vital signs are within normal limits prior to discharge.

## 2024-03-07 NOTE — DISCHARGE NOTE OB - CARE PROVIDER_API CALL
Amy Edward  Obstetrics and Gynecology  [facility] [place] [address]  Phone: [phone]  Fax: [phone]  Follow Up Time: 2 weeks

## 2024-03-07 NOTE — PROGRESS NOTE ADULT - SUBJECTIVE AND OBJECTIVE BOX
YOLY JERONIMO is a 43y  now POD#1 s/p primary  section at 34w gestation for PECwSFoMg, found to have fetal malpresentation during IOL. On procardia 60.    S:    No acute events overnight.   The patient has no complaints.  Pain controlled with current treatment regimen.   She is ambulating without difficulty and tolerating PO.   + flatus/-BM/pending TOV after mag.  She endorses appropriate lochia, which is decreasing.   She is breastfeeding without difficulty.   She denies fevers, chills, nausea and vomiting.   She denies lightheadedness, dizziness, palpitations, chest pain and SOB.     O:    T(C): 36.6 (24 @ 06:00), Max: 36.8 (24 @ 02:04)  HR: 74 (24 @ 06:00) (74 - 113)  BP: 120/73 (24 @ 06:00) (112/69 - 176/86)  RR: 18 (24 @ 06:00) (15 - 21)  SpO2: 97% (24 @ 06:00) (91% - 99%)    Gen: NAD, AOx3, resting comfortably on room air   Abdomen:  Soft, non-tender, non-distended  Incision: Clean/dry/intact with steris in place   Uterus:  Fundus firm below umbilicus  VE:  Expected lochia  Ext:  b/l LE non-tender                           10.2   15.77 )-----------( 327      ( 07 Mar 2024 04:18 )             30.1         131<L>  |  97  |  8.6  ----------------------------<  146<H>  4.5   |  21.0<L>  |  0.40<L>    Ca    7.1<L>      07 Mar 2024 04:18  Mg     4.3         TPro  5.6<L>  /  Alb  3.0<L>  /  TBili  <0.2<L>  /  DBili  x   /  AST  23  /  ALT  16  /  AlkPhos  96

## 2024-03-07 NOTE — DISCHARGE NOTE OB - MEDICATION SUMMARY - MEDICATIONS TO STOP TAKING
I will STOP taking the medications listed below when I get home from the hospital:  None I will STOP taking the medications listed below when I get home from the hospital:    pantoprazole 40 mg oral granule, enteric coated  -- 1 each by mouth once a day (at bedtime)  -- It is very important that you take or use this exactly as directed.  Do not skip doses or discontinue unless directed by your doctor.  Obtain medical advice before taking any non-prescription drugs as some may affect the action of this medication.

## 2024-03-07 NOTE — DISCHARGE NOTE OB - PLAN OF CARE
Patient had  section complicated by T incision of uterus. Antepartum admission for preeclampsia until delivery at 34 weeks. Patient should plan to make an appointment with the office within 1 week for an incision check and 6 weeks for postpartum follow-up. Postpartum precautions were given at the time of discharge. Follow up with your OB for a blood pressure check in 1 week and bring a blood pressure log. Please buy a blood pressure cuff if you do not have one at home and take your blood pressure twice a day while at home and at rest. Continue any prescribed antihypertensive medication as long as blood pressures are above 100/60. You should call your doctor sooner if you develop changes in vision, headache refractory to pain medication, or upper abdominal pain. Please call sooner if there are any other concerns.

## 2024-03-07 NOTE — DISCHARGE NOTE OB - NS MD DC FALL RISK RISK
For information on Fall & Injury Prevention, visit: [email or web address] OR  [email or web address] OR  https://www.cdc.gov/steadi/patient.html

## 2024-03-08 RX ORDER — IBUPROFEN 200 MG
600 TABLET ORAL EVERY 6 HOURS
Refills: 0 | Status: DISCONTINUED | OUTPATIENT
Start: 2024-03-08 | End: 2024-03-09

## 2024-03-08 RX ADMIN — Medication 975 MILLIGRAM(S): at 21:50

## 2024-03-08 RX ADMIN — Medication 600 MILLIGRAM(S): at 19:00

## 2024-03-08 RX ADMIN — Medication 600 MILLIGRAM(S): at 12:00

## 2024-03-08 RX ADMIN — Medication 600 MILLIGRAM(S): at 05:22

## 2024-03-08 RX ADMIN — Medication 975 MILLIGRAM(S): at 21:05

## 2024-03-08 RX ADMIN — ENOXAPARIN SODIUM 40 MILLIGRAM(S): 100 INJECTION SUBCUTANEOUS at 09:57

## 2024-03-08 RX ADMIN — Medication 975 MILLIGRAM(S): at 15:38

## 2024-03-08 RX ADMIN — Medication 30 MILLIGRAM(S): at 00:05

## 2024-03-08 RX ADMIN — SODIUM CHLORIDE 3 MILLILITER(S): 9 INJECTION INTRAMUSCULAR; INTRAVENOUS; SUBCUTANEOUS at 14:36

## 2024-03-08 RX ADMIN — Medication 975 MILLIGRAM(S): at 09:42

## 2024-03-08 RX ADMIN — Medication 60 MILLIGRAM(S): at 05:22

## 2024-03-08 NOTE — PROGRESS NOTE ADULT - ASSESSMENT
A/P:  YOLY JERONIMO is a 43y  now POD#2 s/p primary  section at 34w gestation for PECwSFoMg s/p mag, found to have fetal malpresentation during IOL. On procardia 60.  -Vital signs stable  -Hgb: 11.6 > 10.2  -Tolerating PO, +voiding  -Advance care as tolerated   -Continue routine postpartum and postoperative care and education  -Female infant in NICU  -DVT ppx: SCDs in place, lovenox  -Dispo: Anticipate discharge to home tomorrow pending attending approval.

## 2024-03-08 NOTE — PROGRESS NOTE ADULT - SUBJECTIVE AND OBJECTIVE BOX
YOLY JERONIMO is a 43y  now POD#2 s/p primary  section at 34w gestation for PECwSFoMg, now s/p Mag; pCS 2/2 found to have fetal malpresentation during IOL. On procardia 60.     S:    No acute events overnight.   The patient has no complaints.  Pain controlled with current treatment regimen.   She is ambulating without difficulty and tolerating PO.   + flatus/-BM/+void  She endorses appropriate lochia, which is decreasing.   She is breastfeeding without difficulty.   She denies fevers, chills, nausea and vomiting.   She denies lightheadedness, dizziness, palpitations, chest pain and SOB.     O:    T(C): 36.6 (24 @ 06:00), Max: 36.8 (24 @ 02:04)  HR: 74 (24 @ 06:00) (74 - 113)  BP: 120/73 (24 @ 06:00) (112/69 - 176/86)  RR: 18 (24 @ 06:00) (15 - 21)  SpO2: 97% (24 @ 06:00) (91% - 99%)    Gen: NAD, AOx3, resting comfortably on room air   Abdomen:  Soft, non-tender, non-distended  Incision: Clean/dry/intact with steris in place   Uterus:  Fundus firm below umbilicus  VE:  Expected lochia  Ext:  b/l LE non-tender                           10.2   15.77 )-----------( 327      ( 07 Mar 2024 04:18 )             30.1         131<L>  |  97  |  8.6  ----------------------------<  146<H>  4.5   |  21.0<L>  |  0.40<L>    Ca    7.1<L>      07 Mar 2024 04:18  Mg     4.3     -    TPro  5.6<L>  /  Alb  3.0<L>  /  TBili  <0.2<L>  /  DBili  x   /  AST  23  /  ALT  16  /  AlkPhos  96

## 2024-03-09 VITALS
HEART RATE: 94 BPM | TEMPERATURE: 98 F | OXYGEN SATURATION: 97 % | SYSTOLIC BLOOD PRESSURE: 131 MMHG | DIASTOLIC BLOOD PRESSURE: 84 MMHG | RESPIRATION RATE: 18 BRPM

## 2024-03-09 RX ADMIN — Medication 975 MILLIGRAM(S): at 09:38

## 2024-03-09 RX ADMIN — Medication 600 MILLIGRAM(S): at 05:43

## 2024-03-09 RX ADMIN — Medication 975 MILLIGRAM(S): at 02:27

## 2024-03-09 RX ADMIN — ENOXAPARIN SODIUM 40 MILLIGRAM(S): 100 INJECTION SUBCUTANEOUS at 12:13

## 2024-03-09 RX ADMIN — Medication 600 MILLIGRAM(S): at 12:13

## 2024-03-09 RX ADMIN — Medication 60 MILLIGRAM(S): at 05:43

## 2024-03-09 RX ADMIN — Medication 600 MILLIGRAM(S): at 00:40

## 2024-03-09 RX ADMIN — Medication 975 MILLIGRAM(S): at 15:36

## 2024-03-09 RX ADMIN — Medication 975 MILLIGRAM(S): at 15:06

## 2024-03-09 NOTE — PROGRESS NOTE ADULT - SUBJECTIVE AND OBJECTIVE BOX
YOLY JERONIMO is a 43y  now POD#3 s/p primary  section at 34w gestation for PECwSFoMg, now s/p Mag; pCS 2/2 found to have fetal malpresentation during IOL. On procardia 60.     S:    No acute events overnight.   The patient notes that she feels chills all night.  Pain controlled with current treatment regimen.   She is ambulating without difficulty and tolerating PO.   + flatus/-BM/+voiding  She endorses appropriate lochia, which is decreasing.   She denies fevers, chills, nausea and vomiting.   She denies lightheadedness, dizziness, palpitations, chest pain and SOB.     O:    Vital Signs Last 24 Hrs  T(C): 36.6 (09 Mar 2024 04:50), Max: 36.9 (08 Mar 2024 15:47)  T(F): 97.8 (09 Mar 2024 04:50), Max: 98.5 (08 Mar 2024 23:37)  HR: 91 (09 Mar 2024 05:30) (83 - 101)  BP: 118/78 (09 Mar 2024 05:30) (109/68 - 138/82)  RR: 18 (09 Mar 2024 04:50) (18 - 18)  SpO2: 98% (09 Mar 2024 04:50) (88% - 98%)    Parameters below as of 09 Mar 2024 04:50  Patient On (Oxygen Delivery Method): room air        Gen: NAD, AOx3, resting comfortably on room air. Skin is sweaty  Abdomen:  Soft, non-tender, non-distended  Incision: Clean/dry/intact with steris in place   Uterus:  Fundus firm below umbilicus  VE:  Expected lochia  Ext:  b/l LE non-tender                           10.2   15.77 )-----------( 327      ( 07 Mar 2024 04:18 )             30.1     03-07    131<L>  |  97  |  8.6  ----------------------------<  146<H>  4.5   |  21.0<L>  |  0.40<L>    Ca    7.1<L>      07 Mar 2024 04:18  Mg     4.3     03-07    TPro  5.6<L>  /  Alb  3.0<L>  /  TBili  <0.2<L>  /  DBili  x   /  AST  23  /  ALT  16  /  AlkPhos  96  -

## 2024-03-09 NOTE — PROGRESS NOTE ADULT - ASSESSMENT
A/P:  YOLY JERONIMO is a 43y  now POD#3 s/p primary  section at 34w gestation for PECwSFoMg s/p mag, found to have fetal malpresentation during IOL. On procardia 60.  -Vital signs stable  -Hgb: 11.6 > 10.2  -Chills: no other symptoms. Skin is moist. Afebrile. Uterus is non tender. Pt has no other complaints  -BPs wnl, well controlled on procardia  -Tolerating PO, +voiding  -Advance care as tolerated   -Continue routine postpartum and postoperative care and education  -Female infant in NICU doing well  -DVT ppx: SCDs in place, lovenox  -Dispo: Anticipate discharge to home today pending attending approval.

## 2024-03-09 NOTE — PROGRESS NOTE ADULT - ATTENDING COMMENTS
09-May-2019 13:24
POD1  no complaints  denies headache, blurry vision , epigastric pain  Complicated by PEC w SF  currently on Mag   Abd soft, uterus firm, incision intact   ext no edema   Hb 10.2  /80's , on procardia 60 XL  plan   continue Procardia   DVT ppx   continue Mag levels
POD3  complicated by Pre eclampsia w SF, hx of Mag   on Procardia 60 XL  BP well controlled  pt denies headache, blurry vision, epigastric pain  abd soft, ND, incision intact   ext no edema  mild hyponatremia , secondary to hemodilution. to corrected with diet  pt to continue PO procardia at home   PEC precautions given  plan   DC home today   pt to follow up in clinic in one week
KRANTHI Attending    43 year old  at 32 2/7 weeks admitted for preeclampsia with severe features now s/p 24h course of magnesium, s/p labetalol IV push 20 mg, 40 mg, on standing Procardia 30XL, s/p BMZ (-). Currently stable. Expectant management with plan for delivery at 34 weeks unless change in maternal or fetal status. Continue inpatient observation. Plan for rescue beta before delivery    MADHU Portillo
MFM - IUP at 32w0d admitted for management of preeclampsia with severe features in a pregnancy complicated by AMA and elevated BMI. No overnight events.  Remains normotensive on Procardia XL 30 mg. No symptoms of preeclampsia. Laboratory evaluation stable. Has completed betamethasone and 24 hours of MGSO4 therapy.  Recommend continued inpatient admission and management, with delivery at 34 weeks unless maternal for fetal indication for earlier intervention. NST BID and BPP q M/Th.  Will evaluate interval growth / BPP today.   Maggi Katz MD  Maternal Fetal Medicine
MFM - IUP at 32w1d admitted for management of preeclampsia with severe features. No overnight events. BP controlled on current dose of Procardia. No symptoms of preeclampsia. Fetal testing reassuring.  Continue inpatient management.   Maggi Katz MD  Maternal Fetal Medicine
MFM - IUP at 32w2d admitted for management of preeclampsia with severe features. No overnight events. Remains normotensive on current dose  of Procardia. No symptoms of preeclampsia. No OB complaint.  Fetal testing reassuring. Continue inpatient management. Delivery at 34 weeks unless maternal or fetal indication for earlier intervention. Candidate for rescue betamethasone.   Maggi Katz MD  Maternal Fetal Medicine
MFM - IUP at 33w1d admitted for management of preeclampsia with severe features. Had headache this am which resolved after Tylenol. Currently feels well and reports no symptoms of preeclampsia. No OB complaint. BP stable on Procardia. BPP today 8/8 with normal STEVEN. IOL scheduled on 3/6/24. Continue inpatient management.   Maggi Katz MD  Maternal Fetal Medicine
MFM - IUP at 33w5d admitted for management of preeclampsia with severe features. No overnight events. BP remains labile without severe range values. No symptoms of preeclampsia. No OB complaint. Confirms fetal movement. FHRT reassuring. Plan for ultrasound evaluation of fetal weight.  Started rescue betamethasone course. IOL scheduled for 3/6/24. Will need MGSO4 for seizure prophylaxis and GBS prophylaxis in labor. Patient aware of care plan and all questions answered.   Maggi Katz MD  Maternal Fetal Medicine
MFM - IUP at 34 weeks for IOL today secondary to preeclampsia with severe features.  Transferred to L&D and MGSO4 initiated.  Recommend titrate Procardia to maintain BP <140/90.  Serial labs and BP. Labor management per OB team.   Maggi Katz MD  Maternal Fetal Medicine
MFM - IUP at 32w5d admitted for management of preeclampsia with severe features. Reported decreased fetal movement this am. NST and BPP reassuring.  Currently endorsing fetal movement. No OB complaint. No symptoms. of preeclampsia. Remains normotensive on Procardia XL 30 mg daily. Will schedule IOL at 34 weeks with planned rescue betamethasone prior to induction. GBS+ and will need prophylaxis.   Maggi Katz MD  Maternal Fetal Medicine
CLAYM Attending    43 year old  at 33 2/7 weeks admitted for preeclampsia with severe features s/p magnesium, s/p labetalol IV push , now on Procardia 30 ER, s/p BMZ (-). Currently asymptomatic. Denies headache, vision changes, N/V, RUQ/epigastric pain. Plan for expectant management until 34 weeks. Plan for second course of betamethasone on 3/4-3/5.     MADHU Portillo
Pregnancy complicated by preeclampsia with severe features and other comorbidities. Continue daily oral Procardia Rx. Daily maternal and fetal monitoring. Delivery 34 weeks of gestation or earlier if there is worsening of the maternal or fetal condition.
Pregnancy complicated by preeclampsia with severe features and other comorbidities. Continue daily oral Procardia Rx. Daily maternal and fetal monitoring. Delivery at 34 0/7 weeks of gestation which will be tomorrow.
Pregnancy complicated by preeclampsia with severe features and other comorbidities. Complete 24 hour MgSo4 Rx and 48 hour steroid course. Continue daily oral Procardia Rx. Daily maternal and fetal monitoring. Delivery for worsening maternal or fetal condition.
MFM - IUP at 33w admitted for management of preeclampsia with severe features. No overnight events. BP have been elevated compared to baseline; however, no severe values. Fetal status reassuring. IOL scheduled on 3/6/24.  Will given rescue betamethasone prior to induction.  Aware of need for GBS ppx in labor.   Maggi Katz MD  Maternal Fetal Medicine

## 2024-03-09 NOTE — PROGRESS NOTE ADULT - REASON FOR ADMISSION
pCS
preeclampsia with severe features
pCS
pCS
preeclampsia with severe features

## 2024-03-09 NOTE — PROGRESS NOTE ADULT - ATTENDING SUPERVISION STATEMENT
Resident
Resident/Fellow
Resident
Resident/Fellow
Resident

## 2024-03-10 ENCOUNTER — TRANSCRIPTION ENCOUNTER (OUTPATIENT)
Age: 44
End: 2024-03-10

## 2024-03-11 ENCOUNTER — NON-APPOINTMENT (OUTPATIENT)
Age: 44
End: 2024-03-11

## 2024-03-11 ENCOUNTER — APPOINTMENT (OUTPATIENT)
Dept: CARDIOLOGY | Facility: CLINIC | Age: 44
End: 2024-03-11
Payer: MEDICAID

## 2024-03-11 VITALS — SYSTOLIC BLOOD PRESSURE: 112 MMHG | DIASTOLIC BLOOD PRESSURE: 70 MMHG

## 2024-03-11 VITALS
HEIGHT: 65 IN | WEIGHT: 216 LBS | BODY MASS INDEX: 35.99 KG/M2 | DIASTOLIC BLOOD PRESSURE: 74 MMHG | HEART RATE: 110 BPM | SYSTOLIC BLOOD PRESSURE: 122 MMHG | OXYGEN SATURATION: 95 %

## 2024-03-11 DIAGNOSIS — R60.0 LOCALIZED EDEMA: ICD-10-CM

## 2024-03-11 DIAGNOSIS — R94.31 ABNORMAL ELECTROCARDIOGRAM [ECG] [EKG]: ICD-10-CM

## 2024-03-11 PROCEDURE — 99204 OFFICE O/P NEW MOD 45 MIN: CPT | Mod: 25

## 2024-03-11 PROCEDURE — G2211 COMPLEX E/M VISIT ADD ON: CPT | Mod: NC,1L

## 2024-03-11 PROCEDURE — 93000 ELECTROCARDIOGRAM COMPLETE: CPT

## 2024-03-11 RX ORDER — NIFEDIPINE 60 MG/1
60 TABLET, EXTENDED RELEASE ORAL
Refills: 0 | Status: ACTIVE | COMMUNITY
Start: 2024-03-11

## 2024-03-11 NOTE — DISCUSSION/SUMMARY
[FreeTextEntry1] : 43F h/o  recent postpartum via primary  section at 34 weeks gestation, was admitted from 24 for preeclampsia until 3/9/24 discharged on nifedipine 60mg, refer for cardiology evaluation.   Severe preeclampsia with premature delivery, BP controlled and at goal on nifedipine 60mg, still with residual LE edema, EKG with low voltage and poor R-wave likely due to body habitus, no anginal complains, will obtain Echocardiogram to assess structural heart function.    Preeclampsia, HTN- if home BP <130/80s then reduce nifedipine dose to 30mg and gradually wean off medication if no rebound >140/90 then does not need to resume, continue monitor home BP x1 month after off nifedipine use. Discussed potential risk for early onset HTN and long term cardiovascular risk, low salt diet, exercise/dieting for weight loss.     Follow up in 6 weeks with same day Echo.  [EKG obtained to assist in diagnosis and management of assessed problem(s)] : EKG obtained to assist in diagnosis and management of assessed problem(s)

## 2024-03-11 NOTE — HISTORY OF PRESENT ILLNESS
Pt to IR via cart for feeding tube replacement   [FreeTextEntry1] : 43F h/o  recent postpartum via primary  section at 34 weeks gestation, was admitted from 24 for preeclampsia until 3/9/24 discharged on nifedipine 60mg, refer for cardiology evaluation.   Patient presents with her  for the visit, last delivery was 13yrs ago denies issue with her BP then, she was placed on ASA during this pregnancy but only was taken 2-3x per week. She has noted increase in her BP intermittently during the 2nd trimester and with LE swellings since remains unresolved after postpartum. Home -130/80s using nifedipine 60mg once daily. Has been using around the clock Tylenol and Ibuprofen daily for the post  pain, has GYN follow up in 2 days.    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

## 2024-03-11 NOTE — PHYSICAL EXAM
1630-patient ambulated to bath room with no difficulties. Right radial/brachial with no bleeding or hematoma. 1635- all discharge instructions explained to patient and family. Time allowed for questions no. No questions and concerns at this time. 1640- right radial/brachial checked. Site with no redness or bleeding. pt discharged to home via Wheelchair with family. [Well Developed] : well developed [Well Nourished] : well nourished [No Acute Distress] : no acute distress [Normal Conjunctiva] : normal conjunctiva [Normal Venous Pressure] : normal venous pressure [No Carotid Bruit] : no carotid bruit [Normal S1, S2] : normal S1, S2 [No Murmur] : no murmur [No Rub] : no rub [No Gallop] : no gallop [Clear Lung Fields] : clear lung fields [Good Air Entry] : good air entry [No Respiratory Distress] : no respiratory distress  [Soft] : abdomen soft [Non Tender] : non-tender [No Masses/organomegaly] : no masses/organomegaly [Normal Bowel Sounds] : normal bowel sounds [No Edema] : no edema [Normal Gait] : normal gait [No Cyanosis] : no cyanosis [No Clubbing] : no clubbing [No Varicosities] : no varicosities [No Skin Lesions] : no skin lesions [No Rash] : no rash [Moves all extremities] : moves all extremities [No Focal Deficits] : no focal deficits [Normal Speech] : normal speech [Alert and Oriented] : alert and oriented [Normal memory] : normal memory

## 2024-04-11 LAB — SURGICAL PATHOLOGY STUDY: SIGNIFICANT CHANGE UP

## 2024-04-30 ENCOUNTER — APPOINTMENT (OUTPATIENT)
Dept: CARDIOLOGY | Facility: CLINIC | Age: 44
End: 2024-04-30
Payer: MEDICAID

## 2024-04-30 VITALS
HEART RATE: 106 BPM | SYSTOLIC BLOOD PRESSURE: 132 MMHG | DIASTOLIC BLOOD PRESSURE: 78 MMHG | HEIGHT: 65 IN | OXYGEN SATURATION: 96 % | WEIGHT: 210 LBS | BODY MASS INDEX: 34.99 KG/M2

## 2024-04-30 VITALS — SYSTOLIC BLOOD PRESSURE: 138 MMHG | DIASTOLIC BLOOD PRESSURE: 80 MMHG

## 2024-04-30 DIAGNOSIS — E66.9 OBESITY, UNSPECIFIED: ICD-10-CM

## 2024-04-30 DIAGNOSIS — O14.10 SEVERE PRE-ECLAMPSIA, UNSPECIFIED TRIMESTER: ICD-10-CM

## 2024-04-30 PROCEDURE — 93306 TTE W/DOPPLER COMPLETE: CPT

## 2024-04-30 PROCEDURE — G2211 COMPLEX E/M VISIT ADD ON: CPT | Mod: NC,1L

## 2024-04-30 PROCEDURE — 99214 OFFICE O/P EST MOD 30 MIN: CPT

## 2024-04-30 NOTE — CARDIOLOGY SUMMARY
[de-identified] : 3/11/24- Sinus 102, low voltage with poor R-wave progression, QTc 401 [de-identified] : 4/30/24- LV EF 75%

## 2024-04-30 NOTE — DISCUSSION/SUMMARY
[FreeTextEntry1] : 43F h/o obesity (BMI 35),   recent postpartum via primary  section at 34 weeks gestation, was admitted from 24 for preeclampsia until 3/9/24 discharged on nifedipine 60mg, refer for cardiology evaluation seen on 3/2024 EKG with low voltage/poor R-wave and residual LE edema, returns for follow up and Echocardiogram with normal LV EF 75%.   Severe preeclampsia with premature delivery, BP controlled and at goal on nifedipine 60mg not able to wean down nifedipine dose yet, suspect may develop early onset HTN give now 2 months postpartum, advised low salt diet and start exercise for weight loss. Dependent edema not present on exam today advised can use compression stocking, Echo with normal structural heart function.     Preeclampsia, HTN- if home BP <130/80s then reduce nifedipine dose to 30mg and gradually wean off medication if no rebound >140/90 then does not need to resume, continue monitor home BP x1 month after off nifedipine use. Discussed potential risk for early onset HTN and long term cardiovascular risk, low salt diet, exercise/dieting for weight loss as reports still 30 lbs above her average weight prior to the pregnancy. .     Follow up in 6 weeks with same day Echo.

## 2024-06-20 ENCOUNTER — TRANSCRIPTION ENCOUNTER (OUTPATIENT)
Age: 44
End: 2024-06-20

## 2024-08-05 ENCOUNTER — NON-APPOINTMENT (OUTPATIENT)
Age: 44
End: 2024-08-05

## 2024-08-05 ENCOUNTER — APPOINTMENT (OUTPATIENT)
Dept: CARDIOLOGY | Facility: CLINIC | Age: 44
End: 2024-08-05

## 2024-08-05 PROCEDURE — 93000 ELECTROCARDIOGRAM COMPLETE: CPT

## 2024-08-05 PROCEDURE — 99214 OFFICE O/P EST MOD 30 MIN: CPT | Mod: 25

## 2024-08-07 ENCOUNTER — NON-APPOINTMENT (OUTPATIENT)
Age: 44
End: 2024-08-07

## 2024-08-08 ENCOUNTER — NON-APPOINTMENT (OUTPATIENT)
Age: 44
End: 2024-08-08

## 2024-08-08 PROBLEM — I10 BENIGN ESSENTIAL HYPERTENSION: Status: ACTIVE | Noted: 2024-08-08

## 2024-08-08 PROBLEM — E87.5 HYPERKALEMIA: Status: ACTIVE | Noted: 2024-08-08

## 2024-08-13 ENCOUNTER — NON-APPOINTMENT (OUTPATIENT)
Age: 44
End: 2024-08-13

## 2024-08-13 VITALS — DIASTOLIC BLOOD PRESSURE: 90 MMHG | SYSTOLIC BLOOD PRESSURE: 132 MMHG

## 2024-08-27 ENCOUNTER — APPOINTMENT (OUTPATIENT)
Dept: INTERNAL MEDICINE | Facility: CLINIC | Age: 44
End: 2024-08-27
Payer: MEDICAID

## 2024-08-27 VITALS
HEIGHT: 65 IN | BODY MASS INDEX: 33.49 KG/M2 | WEIGHT: 201 LBS | HEART RATE: 72 BPM | OXYGEN SATURATION: 97 % | DIASTOLIC BLOOD PRESSURE: 78 MMHG | SYSTOLIC BLOOD PRESSURE: 128 MMHG

## 2024-08-27 DIAGNOSIS — I10 ESSENTIAL (PRIMARY) HYPERTENSION: ICD-10-CM

## 2024-08-27 DIAGNOSIS — Z23 ENCOUNTER FOR IMMUNIZATION: ICD-10-CM

## 2024-08-27 DIAGNOSIS — Z82.49 FAMILY HISTORY OF ISCHEMIC HEART DISEASE AND OTHER DISEASES OF THE CIRCULATORY SYSTEM: ICD-10-CM

## 2024-08-27 DIAGNOSIS — Z80.9 FAMILY HISTORY OF MALIGNANT NEOPLASM, UNSPECIFIED: ICD-10-CM

## 2024-08-27 DIAGNOSIS — Z00.00 ENCOUNTER FOR GENERAL ADULT MEDICAL EXAMINATION W/OUT ABNORMAL FINDINGS: ICD-10-CM

## 2024-08-27 DIAGNOSIS — E66.9 OBESITY, UNSPECIFIED: ICD-10-CM

## 2024-08-27 PROCEDURE — 99386 PREV VISIT NEW AGE 40-64: CPT | Mod: 25

## 2024-08-27 PROCEDURE — G0444 DEPRESSION SCREEN ANNUAL: CPT | Mod: 59

## 2024-08-27 PROCEDURE — 99214 OFFICE O/P EST MOD 30 MIN: CPT | Mod: 25

## 2024-08-27 NOTE — HISTORY OF PRESENT ILLNESS
[FreeTextEntry1] : To establish primary care. Follow-up for hypertension  [de-identified] : 43/F PMHx obesity, postpartum preeclampsia Last pregnancy: 02/2024 Follows with cardiology (Dr. Rollins). Offers no acute complaints

## 2024-08-27 NOTE — HEALTH RISK ASSESSMENT
[No] : No [Little interest or pleasure doing things] : 1) Little interest or pleasure doing things [Feeling down, depressed, or hopeless] : 2) Feeling down, depressed, or hopeless [0] : 2) Feeling down, depressed, or hopeless: Not at all (0) [PHQ-2 Negative - No further assessment needed] : PHQ-2 Negative - No further assessment needed [PHQ-9 Negative - No further assessment needed] : PHQ-9 Negative - No further assessment needed [Time Spent: ___ Minutes] : I spent [unfilled] minutes performing a depression screening for this patient. [Never] : Never [HIV Test offered] : HIV Test offered [Hepatitis C test offered] : Hepatitis C test offered [With Family] : lives with family [Unemployed] : unemployed [] :  [# Of Children ___] : has [unfilled] children [Sexually Active] : sexually active [Fully functional (bathing, dressing, toileting, transferring, walking, feeding)] : Fully functional (bathing, dressing, toileting, transferring, walking, feeding) [Fully functional (using the telephone, shopping, preparing meals, housekeeping, doing laundry, using] : Fully functional and needs no help or supervision to perform IADLs (using the telephone, shopping, preparing meals, housekeeping, doing laundry, using transportation, managing medications and managing finances) [Excellent] : ~his/her~  mood as  excellent [[id number]] : 0 [Reports changes in hearing] : Reports no changes in hearing [Reports changes in vision] : Reports no changes in vision [Smoke Detector] : no smoke detector [Safety elements used in home] : no safety elements used in home

## 2024-08-27 NOTE — PAST MEDICAL HISTORY
[Menstruating] : menstruating [Regular Cycle Intervals] : have been regular [Total Preg ___] : G[unfilled] [Live Births ___] : P[unfilled]  [Abortions ___] : Abortions:[unfilled] [Living ___] : Living: [unfilled]

## 2024-08-27 NOTE — ASSESSMENT
[FreeTextEntry1] : HMT: Mammogram with GYN (normal per patient), vaccinations up-to-date.  Declined flu shot Preeclampsia/HTN: TTE WNL; currently on losartan, will hold losartan for now.  Will recheck BP in 1 week with clinic nurse.  Check urine protein.  Follows with cardiology () Obesity: Advised weight loss.

## 2024-08-28 LAB
ALBUMIN SERPL ELPH-MCNC: 4.6 G/DL
ALP BLD-CCNC: 107 U/L
ALT SERPL-CCNC: 49 U/L
ANION GAP SERPL CALC-SCNC: 15 MMOL/L
APPEARANCE: ABNORMAL
AST SERPL-CCNC: 29 U/L
BACTERIA: ABNORMAL /HPF
BASOPHILS # BLD AUTO: 0.05 K/UL
BASOPHILS NFR BLD AUTO: 1 %
BILIRUB SERPL-MCNC: 0.3 MG/DL
BILIRUBIN URINE: NEGATIVE
BLOOD URINE: NEGATIVE
BUN SERPL-MCNC: 11 MG/DL
CALCIUM SERPL-MCNC: 9.1 MG/DL
CAST: 2 /LPF
CHLORIDE SERPL-SCNC: 106 MMOL/L
CHOLEST SERPL-MCNC: 186 MG/DL
CO2 SERPL-SCNC: 20 MMOL/L
COLOR: NORMAL
CREAT SERPL-MCNC: 0.51 MG/DL
CREAT SPEC-SCNC: 258 MG/DL
EGFR: 120 ML/MIN/1.73M2
EOSINOPHIL # BLD AUTO: 0.1 K/UL
EOSINOPHIL NFR BLD AUTO: 2 %
EPITHELIAL CELLS: >36 /HPF
ESTIMATED AVERAGE GLUCOSE: 131 MG/DL
GLUCOSE QUALITATIVE U: NEGATIVE MG/DL
GLUCOSE SERPL-MCNC: 112 MG/DL
HBA1C MFR BLD HPLC: 6.2 %
HCT VFR BLD CALC: 42.5 %
HCV AB SER QL: NONREACTIVE
HCV S/CO RATIO: 0.16 S/CO
HDLC SERPL-MCNC: 33 MG/DL
HGB BLD-MCNC: 12.9 G/DL
IMM GRANULOCYTES NFR BLD AUTO: 0 %
IRON SATN MFR SERPL: 17 %
IRON SERPL-MCNC: 73 UG/DL
KETONES URINE: NEGATIVE MG/DL
LDLC SERPL CALC-MCNC: 118 MG/DL
LEUKOCYTE ESTERASE URINE: NEGATIVE
LYMPHOCYTES # BLD AUTO: 2.15 K/UL
LYMPHOCYTES NFR BLD AUTO: 42.6 %
MAN DIFF?: NORMAL
MCHC RBC-ENTMCNC: 25.4 PG
MCHC RBC-ENTMCNC: 30.4 GM/DL
MCV RBC AUTO: 83.8 FL
MICROALBUMIN 24H UR DL<=1MG/L-MCNC: 1.3 MG/DL
MICROALBUMIN/CREAT 24H UR-RTO: 5 MG/G
MICROSCOPIC-UA: NORMAL
MONOCYTES # BLD AUTO: 0.42 K/UL
MONOCYTES NFR BLD AUTO: 8.3 %
NEUTROPHILS # BLD AUTO: 2.33 K/UL
NEUTROPHILS NFR BLD AUTO: 46.1 %
NITRITE URINE: NEGATIVE
NONHDLC SERPL-MCNC: 152 MG/DL
PH URINE: 5.5
PLATELET # BLD AUTO: 482 K/UL
POTASSIUM SERPL-SCNC: 4.4 MMOL/L
PROT SERPL-MCNC: 7.6 G/DL
PROTEIN URINE: NORMAL MG/DL
RBC # BLD: 5.07 M/UL
RBC # FLD: 15 %
RED BLOOD CELLS URINE: 2 /HPF
SODIUM SERPL-SCNC: 140 MMOL/L
SPECIFIC GRAVITY URINE: >1.03
TIBC SERPL-MCNC: 433 UG/DL
TRIGL SERPL-MCNC: 196 MG/DL
TSH SERPL-ACNC: 1.28 UIU/ML
UIBC SERPL-MCNC: 360 UG/DL
UROBILINOGEN URINE: 0.2 MG/DL
WBC # FLD AUTO: 5.05 K/UL
WHITE BLOOD CELLS URINE: 4 /HPF

## 2024-08-29 LAB — HIV1+2 AB SPEC QL IA.RAPID: NONREACTIVE

## 2024-11-18 ENCOUNTER — APPOINTMENT (OUTPATIENT)
Dept: CARDIOLOGY | Facility: CLINIC | Age: 44
End: 2024-11-18

## 2025-01-02 NOTE — OB RN PATIENT PROFILE - GRAVIDA, OB PROFILE
[facility] Call Center    Phone Message    May a detailed message be left on voicemail: yes     Reason for Call: Other: Jennifer from [facility] calling regarding patient having moderate SOB when reclining,  Auditory talking. No chest pain and no edma. Please reach out to Lisandra. Thank you       Action Taken: Other: cardiology     Travel Screening: Not Applicable     Date of Service:                                                                      6

## 2025-02-14 NOTE — PROGRESS NOTE ADULT - ASSESSMENT
Pt called regarding his refill of Oxycodone. I informed him that Dr Norwood said it did not require further narcotics. Pt stated he is still in pain and would like to speak with a nurse. I transferred the call to GI triage and Ave took the call   43 year old  at 33w0d by last menstrual period 23, estimated due date 24 admitted for preeclampsia with severe features now s/p 24h course of magnesium, s/p labetalol IV push 20 mg, 40 mg, on standing Procardia 30XL, s/p BMZ (-)

## 2025-04-30 ENCOUNTER — APPOINTMENT (OUTPATIENT)
Dept: INTERNAL MEDICINE | Facility: CLINIC | Age: 45
End: 2025-04-30